# Patient Record
Sex: FEMALE | Race: WHITE | NOT HISPANIC OR LATINO | Employment: FULL TIME | URBAN - METROPOLITAN AREA
[De-identification: names, ages, dates, MRNs, and addresses within clinical notes are randomized per-mention and may not be internally consistent; named-entity substitution may affect disease eponyms.]

---

## 2017-04-03 ENCOUNTER — TRANSFERRED RECORDS (OUTPATIENT)
Dept: HEALTH INFORMATION MANAGEMENT | Facility: CLINIC | Age: 57
End: 2017-04-03

## 2017-11-16 ENCOUNTER — HOSPITAL ENCOUNTER (OUTPATIENT)
Dept: GENERAL RADIOLOGY | Facility: CLINIC | Age: 57
Discharge: HOME | End: 2017-11-16
Attending: INTERNAL MEDICINE | Admitting: INTERNAL MEDICINE
Payer: COMMERCIAL

## 2017-11-16 DIAGNOSIS — R05.9 COUGH: ICD-10-CM

## 2017-11-16 PROCEDURE — 71020 XR CHEST 2 VW: CPT

## 2019-05-13 ENCOUNTER — THERAPY VISIT (OUTPATIENT)
Dept: PHYSICAL THERAPY | Facility: CLINIC | Age: 59
End: 2019-05-13
Payer: COMMERCIAL

## 2019-05-13 DIAGNOSIS — M54.50 CHRONIC BILATERAL LOW BACK PAIN WITHOUT SCIATICA: ICD-10-CM

## 2019-05-13 DIAGNOSIS — G89.29 CHRONIC BILATERAL LOW BACK PAIN WITHOUT SCIATICA: ICD-10-CM

## 2019-05-13 PROCEDURE — 97110 THERAPEUTIC EXERCISES: CPT | Mod: GP | Performed by: PHYSICAL THERAPIST

## 2019-05-13 PROCEDURE — 97161 PT EVAL LOW COMPLEX 20 MIN: CPT | Mod: GP | Performed by: PHYSICAL THERAPIST

## 2019-05-13 NOTE — PROGRESS NOTES
[facility] Initial Evaluation  Subjective:  The history is provided by the patient. No  was used.   Lisa Wilson is a 58 year old female with a lumbar condition.  Condition occurred with:  Bending.  Context: while lifting suit case.  This is a chronic condition  Pt reports having bilateral LBP with occasional L thigh pain that has been present since leaning forward to  something off floor on 3/5/19. .    Patient reports pain:  Central lumbar spine.  Radiates to:  Thigh left.  Pain is described as aching and is intermittent and reported as 3/10.  Associated symptoms:  Loss of motion. Pain is worse during the day.  Symptoms are exacerbated by bending and lifting and relieved by nothing.  Since onset symptoms are unchanged.  Special tests:  X-ray (negative).  Previous treatment includes chiropractic.  There was no improvement following previous treatment.  General health as reported by patient is good.  Pertinent medical history includes:  Asthma and thyroid problems.  Medical allergies: yes (see EPIC).  Other surgeries include:  Orthopedic surgery (foot and knee).  Current medications:  Thyroid medication.  Current occupation is   .  Patient is working in normal job without restrictions.  Primary job tasks include:  Lifting, prolonged standing, operating a machine, prolonged sitting and repetitive tasks.    Barriers include:  None as reported by the patient.    Red flags:  None as reported by the patient.                        Objective:  Standing Alignment:        Lumbar:  Lordosis decr                Flexibility/Screens:   Positive screens:  Lumbar and SI Joint                   Lumbar/SI Evaluation  ROM:    AROM Lumbar:   Flexion:          100% with ERP  Ext:                    75% with ERP   Side Bend:        Left:  100%    Right:  75% with ERP  Rotation:           Left:     Right:   Side Glide:        Left:     Right:           Lumbar Myotomes:   normal            Lumbar DTR's:  normal        Lumbar Dermtomes:  normal                Neural Tension/Mobility:  Lumbar:  Normal        Lumbar Palpation:  normal          Spinal Segmental Conclusions:     Level: Hypo noted at L5, L4, L3, L2 and L1                                                   General     ROS    Assessment/Plan:    Patient is a 58 year old female with lumbar complaints.    Patient has the following significant findings with corresponding treatment plan.                    Diagnosis 1:  LBP  Pain -  US, manual therapy, self management, education, directional preference exercise and home program  Decreased ROM/flexibility - manual therapy and therapeutic exercise  Decreased strength - therapeutic exercise and therapeutic activities  Impaired muscle performance - neuro re-education  Decreased function - therapeutic activities  Therapy Evaluation Codes:   Previous and current functional limitations:  (See Goal Flow Sheet for this information)    Short term and Long term goals: (See Goal Flow Sheet for this information)     Communication ability:  Patient appears to be able to clearly communicate and understand verbal and written communication and follow directions correctly.  Treatment Explanation - The following has been discussed with the patient:   RX ordered/plan of care  Anticipated outcomes  Possible risks and side effects  This patient would benefit from PT intervention to resume normal activities.   Rehab potential is good.    Frequency:  1 X week, once daily  Duration:  for 6 weeks  Discharge Plan:  Achieve all LTG.  Independent in home treatment program.  Reach maximal therapeutic benefit.      Please refer to the daily flowsheet for treatment today, total treatment time and time spent performing 1:1 timed codes.

## 2019-05-13 NOTE — LETTER
[facility] ATHLETIC [facility] PHYSICAL THERAPY  [address]  [facility] [address]  [phone]    May 14, 2019    Re: Lisa Wilson   :   1960  MRN:  [id number]   REFERRING PHYSICIAN:   Chiara Hogan    [facility]  Date of Initial Evaluation:  19  Visits:  Rxs Used: 1  Reason for Referral:  Chronic bilateral low back pain without sciatica    EVALUATION SUMMARY    [facility] Athletic [facility] Initial Evaluation  Subjective:  The history is provided by the patient. No  was used.   Lisa Wilson is a 58 year old female with a lumbar condition.  Condition occurred with:  Bending.  Context: while lifting suit case.  This is a chronic condition  Pt reports having bilateral LBP with occasional L thigh pain that has been present since leaning forward to  something off floor on 3/5/19.   Patient reports pain:  Central lumbar spine.  Radiates to:  Thigh left.  Pain is described as aching and is intermittent and reported as 3/10.  Associated symptoms:  Loss of motion. Pain is worse during the day.  Symptoms are exacerbated by bending and lifting and relieved by nothing.  Since onset symptoms are unchanged.  Special tests:  X-ray (negative).  Previous treatment includes chiropractic.  There was no improvement following previous treatment.  General health as reported by patient is good.  Pertinent medical history includes:  Asthma and thyroid problems.  Medical allergies: yes (see EPIC).  Other surgeries include:  Orthopedic surgery (foot and knee).  Current medications:  Thyroid medication.  Current occupation is .  Patient is working in normal job without restrictions.  Primary job tasks include:  Lifting, prolonged standing, operating a machine, prolonged sitting and repetitive tasks.  Barriers include:  None as reported by the patient.  Red flags:  None as reported  by the patient.                Objective:  Standing Alignment:    Lumbar:  Lordosis decr  Flexibility/Screens:   Positive screens:  Lumbar and SI Joint      Lumbar/SI Evaluation  ROM:    AROM Lumbar:   Flexion:          100% with ERP  Ext:                    75% with ERP   Side Bend:        Left:  100%    Right:  75% with ERP  Rotation:           Left:     Right:   Side Glide:        Left:     Right:   Lumbar Myotomes:  normal  Lumbar DTR's:  normal  Lumbar Dermtomes:  normal  Neural Tension/Mobility:  Lumbar:  Normal    Lumbar Palpation:  normal  Re: Lisa Wilson   :   1960          Spinal Segmental Conclusions: Level: Hypo noted at L5, L4, L3, L2 and L1    Assessment/Plan:    Patient is a 58 year old female with lumbar complaints.    Patient has the following significant findings with corresponding treatment plan.                Diagnosis 1:  LBP  Pain -  US, manual therapy, self management, education, directional preference exercise and home program  Decreased ROM/flexibility - manual therapy and therapeutic exercise  Decreased strength - therapeutic exercise and therapeutic activities  Impaired muscle performance - neuro re-education  Decreased function - therapeutic activities.  Previous and current functional limitations:  (See Goal Flow Sheet for this information)    Short term and Long term goals: (See Goal Flow Sheet for this information)     Communication ability:  Patient appears to be able to clearly communicate and understand verbal and written communication and follow directions correctly.  Treatment Explanation - The following has been discussed with the patient:   RX ordered/plan of care  Anticipated outcomes  Possible risks and side effects  This patient would benefit from PT intervention to resume normal activities.   Rehab potential is good.    Frequency:  1 X week, once daily  Duration:  for 6 weeks  Discharge Plan:  Achieve all LTG.  Independent in home treatment program.  Reach  maximal therapeutic benefit.    Thank you for your referral.    INQUIRIES  Therapist: Usman Rucker,   [facility] [place] [facility]AP[facility]  [address] [address] [address]  Phone: [phone]  Fax: [phone]

## 2019-05-20 ENCOUNTER — THERAPY VISIT (OUTPATIENT)
Dept: PHYSICAL THERAPY | Facility: CLINIC | Age: 59
End: 2019-05-20
Payer: COMMERCIAL

## 2019-05-20 DIAGNOSIS — G89.29 CHRONIC BILATERAL LOW BACK PAIN WITHOUT SCIATICA: ICD-10-CM

## 2019-05-20 DIAGNOSIS — M54.50 CHRONIC BILATERAL LOW BACK PAIN WITHOUT SCIATICA: ICD-10-CM

## 2019-05-20 PROCEDURE — 97110 THERAPEUTIC EXERCISES: CPT | Mod: GP | Performed by: PHYSICAL THERAPIST

## 2019-05-20 PROCEDURE — 97035 APP MDLTY 1+ULTRASOUND EA 15: CPT | Mod: GP | Performed by: PHYSICAL THERAPIST

## 2019-06-04 ENCOUNTER — THERAPY VISIT (OUTPATIENT)
Dept: PHYSICAL THERAPY | Facility: CLINIC | Age: 59
End: 2019-06-04
Payer: COMMERCIAL

## 2019-06-04 DIAGNOSIS — M54.50 CHRONIC BILATERAL LOW BACK PAIN WITHOUT SCIATICA: ICD-10-CM

## 2019-06-04 DIAGNOSIS — G89.29 CHRONIC BILATERAL LOW BACK PAIN WITHOUT SCIATICA: ICD-10-CM

## 2019-06-04 PROCEDURE — 97035 APP MDLTY 1+ULTRASOUND EA 15: CPT | Mod: GP | Performed by: PHYSICAL THERAPIST

## 2019-06-04 PROCEDURE — 97110 THERAPEUTIC EXERCISES: CPT | Mod: GP | Performed by: PHYSICAL THERAPIST

## 2019-06-27 PROBLEM — M54.50 CHRONIC BILATERAL LOW BACK PAIN WITHOUT SCIATICA: Status: RESOLVED | Noted: 2019-05-13 | Resolved: 2019-06-27

## 2019-06-27 PROBLEM — G89.29 CHRONIC BILATERAL LOW BACK PAIN WITHOUT SCIATICA: Status: RESOLVED | Noted: 2019-05-13 | Resolved: 2019-06-27

## 2019-06-27 NOTE — PROGRESS NOTES
"Discharge Note    Progress reporting period is from initial eval to Jun 4, 2019.     Lisa failed to return for next follow up visit and current status is unknown.  Please see information below for last relevant information on current status.  Patient seen for Rxs Used: 3 visits.  SUBJECTIVE  Subjective changes noted by patient:  Subjective: Better ovarall.  Episodes of increased pain after lifting 40# suitcase.  Functionig at 75% of whre she want sot be.  Good days and bads days persist and apprehension occasionally with lifting baggage  .  Current pain level is Current Pain level: 2/10.     Previous pain level was  Initial Pain level: 3/10.   Changes in function:  Yes (See Goal flowsheet attached for changes in current functional level)  Adverse reaction to treatment or activity: None    OBJECTIVE  Changes noted in objective findings: Objective: LROM: flexion 100%, Ext 80+% with ER \"stiffness\", R and L SBing 100% with ER \"stiffness\"  Tender L PSIS persists     ASSESSMENT/PLAN  Diagnosis: LBP   DIAGP:  The encounter diagnosis was Chronic bilateral low back pain without sciatica.  Updated problem list and treatment plan:   Decreased strength - HEP  STG/LTGs have been met or progress has been made towards goals:  Yes, please see goal flowsheet for most current information  Assessment of Progress: current status is unknown.  Last current status: Assessment of progress: Pt is progressing well, Pt is progressing as expected   Self Management Plans:  HEP  I have re-evaluated this patient and find that the nature, scope, duration and intensity of the therapy is appropriate for the medical condition of the patient.  Lisa continues to require the following intervention to meet STG and LTG's:  HEP.    Recommendations:  Discharge with current home program.  Patient to follow up with MD as needed.    Please refer to the daily flowsheet for treatment today, total treatment time and time spent performing 1:1 timed codes.  "

## 2019-08-16 ENCOUNTER — THERAPY VISIT (OUTPATIENT)
Dept: PHYSICAL THERAPY | Facility: CLINIC | Age: 59
End: 2019-08-16
Payer: COMMERCIAL

## 2019-08-16 DIAGNOSIS — M54.50 CHRONIC BILATERAL LOW BACK PAIN WITHOUT SCIATICA: ICD-10-CM

## 2019-08-16 DIAGNOSIS — G89.29 CHRONIC BILATERAL LOW BACK PAIN WITHOUT SCIATICA: ICD-10-CM

## 2019-08-16 PROCEDURE — 97110 THERAPEUTIC EXERCISES: CPT | Mod: GP | Performed by: PHYSICAL THERAPIST

## 2019-08-16 PROCEDURE — 97112 NEUROMUSCULAR REEDUCATION: CPT | Mod: GP | Performed by: PHYSICAL THERAPIST

## 2019-08-16 PROCEDURE — 97035 APP MDLTY 1+ULTRASOUND EA 15: CPT | Mod: GP | Performed by: PHYSICAL THERAPIST

## 2019-08-16 NOTE — LETTER
[facility] ATHLETIC [facility] PHYSICAL THERAPY  [address]  [facility] [address]  [phone]    2019    Re: Lisa Wilson   :   1960  MRN:  [id number]   REFERRING PHYSICIAN:   Chiara Hogan    [facility] ATHLETIC [facility] PHYSICAL [facility]  Date of Initial Evaluation:  19  Visits:  Rxs Used: 4  Reason for Referral:  Chronic bilateral low back pain without sciatica      PROGRESS  REPORT:  Progress reporting period is from IE to 19.       SUBJECTIVE  Subjective changes noted by patient:  Subjective: Pt returns after break in PT due to vacation and was doing better.  States over past couple weeks noted increasing pain again.  Restarted medication (anti-inflam).  Current pain level is  Current Pain level: (2-3/10).  Previous pain level was Initial Pain level: 3/10.  Changes in function:  Yes, but recent flare up has limited function.  Adverse reaction to treatment or activity: activity - daily activity.  Oswestry Score: 12 %     OBJECTIVE  Changes noted in objective findings:  Objective: Lx ROM: flex ERP, ext min loss +,   B SB ERT/P.  Hypomobile L2-5 + and TTP B PSIS     ASSESSMENT/PLAN  Updated problem list and treatment plan: Diagnosis 1:  LBP  Pain -  hot/cold therapy, US, manual therapy, directional preference exercise and home program.  Decreased ROM/flexibility - manual therapy and therapeutic exercise.  Decreased strength - therapeutic exercise and therapeutic activities.  Impaired muscle performance - neuro re-education.  Decreased function - therapeutic activities.  Impaired posture - neuro re-education.  STG/LTGs have been met or progress has been made towards goals:  Yes, but recent flare up showed functional regression.  Assessment of Progress: The patient's condition has exacerbated.  Self Management Plans:  Patient has been instructed in a home treatment program.  Patient  has been instructed in self management  of symptoms.  I have re-evaluated this patient and find that the nature, scope, duration and intensity of the therapy is appropriate for the medical condition of the patient.  Lisa continues to require the following intervention to meet STG and LTG's:  PT    Recommendations:  This patient would benefit from continued therapy.     Frequency:  1 X week, once daily  Duration:  for 4-6 visits    If you agree to the above recommendation please sign and date below and return this order to the clinic listed below.        Signature __________________________________________Date: ___________    Thank you for your referral.    INQUIRIES  Therapist:  Malik Mccarty, [facility]  [facility] [place] [facility]  [id number] [place] [address]  Phone: [phone]  Fax: [phone]

## 2019-08-16 NOTE — PROGRESS NOTES
Subjective:  HPI  Oswestry Score: 12 %                 Objective:  System    Physical Exam    General     ROS    Assessment/Plan:    PROGRESS  REPORT    Progress reporting period is from IE to 8/16/19.       SUBJECTIVE  Subjective changes noted by patient:  .  Subjective: Pt returns after break in PT due to vacation and was doing better.  States over past couple weeks noted increasing pain again.  Restarted medication (anti-inflam).    Current pain level is  Current Pain level: (2-3/10).     Previous pain level was   Initial Pain level: 3/10.   Changes in function:  Yes, but recent flare up has limited function.  Adverse reaction to treatment or activity: activity - daily activity.    OBJECTIVE  Changes noted in objective findings:    Objective: Lx ROM: flex ERP, ext min loss +,   B SB ERT/P.  Hypomobile L2-5 + and TTP B PSIS     ASSESSMENT/PLAN  Updated problem list and treatment plan: Diagnosis 1:  LBP  Pain -  hot/cold therapy, US, manual therapy, directional preference exercise and home program  Decreased ROM/flexibility - manual therapy and therapeutic exercise  Decreased strength - therapeutic exercise and therapeutic activities  Impaired muscle performance - neuro re-education  Decreased function - therapeutic activities  Impaired posture - neuro re-education  STG/LTGs have been met or progress has been made towards goals:  Yes, but recent flare up showed functional regression.  Assessment of Progress: The patient's condition has exacerbated.  Self Management Plans:  Patient has been instructed in a home treatment program.  Patient  has been instructed in self management of symptoms.  I have re-evaluated this patient and find that the nature, scope, duration and intensity of the therapy is appropriate for the medical condition of the patient.  Lisa continues to require the following intervention to meet STG and LTG's:  PT    Recommendations:  This patient would benefit from continued therapy.     Frequency:   1 X week, once daily  Duration:  for 4-6 visits      If you agree to the above recommendation please sign and date below and return this order to the clinic listed below.        Signature __________________________________________Date: ___________          Please refer to the daily flowsheet for treatment today, total treatment time and time spent performing 1:1 timed codes.

## 2020-12-07 LAB — MAMMOGRAM: NORMAL

## 2020-12-23 ENCOUNTER — TRANSFERRED RECORDS (OUTPATIENT)
Dept: SURGERY | Facility: CLINIC | Age: 60
End: 2020-12-23

## 2021-03-04 ENCOUNTER — MEDICAL CORRESPONDENCE (OUTPATIENT)
Dept: HEALTH INFORMATION MANAGEMENT | Facility: CLINIC | Age: 61
End: 2021-03-04

## 2021-03-14 ENCOUNTER — HEALTH MAINTENANCE LETTER (OUTPATIENT)
Age: 61
End: 2021-03-14

## 2021-03-15 ENCOUNTER — TRANSFERRED RECORDS (OUTPATIENT)
Dept: HEALTH INFORMATION MANAGEMENT | Facility: CLINIC | Age: 61
End: 2021-03-15

## 2021-03-17 ENCOUNTER — TRANSFERRED RECORDS (OUTPATIENT)
Dept: HEALTH INFORMATION MANAGEMENT | Facility: CLINIC | Age: 61
End: 2021-03-17

## 2021-03-22 ENCOUNTER — TRANSFERRED RECORDS (OUTPATIENT)
Dept: HEALTH INFORMATION MANAGEMENT | Facility: CLINIC | Age: 61
End: 2021-03-22

## 2021-07-06 ENCOUNTER — HOSPITAL ENCOUNTER (EMERGENCY)
Facility: CLINIC | Age: 61
Discharge: HOME | End: 2021-07-07
Attending: EMERGENCY MEDICINE | Admitting: EMERGENCY MEDICINE
Payer: COMMERCIAL

## 2021-07-06 DIAGNOSIS — T85.79XA: ICD-10-CM

## 2021-07-06 DIAGNOSIS — L03.313 CELLULITIS OF CHEST WALL: ICD-10-CM

## 2021-07-06 LAB
ANION GAP SERPL CALCULATED.3IONS-SCNC: 5 MMOL/L (ref 3–14)
BASOPHILS # BLD AUTO: 0.1 10E9/L (ref 0–0.2)
BASOPHILS NFR BLD AUTO: 0.9 %
BUN SERPL-MCNC: 12 MG/DL (ref 7–30)
CALCIUM SERPL-MCNC: 9.3 MG/DL (ref 8.5–10.1)
CHLORIDE SERPL-SCNC: 108 MMOL/L (ref 94–109)
CO2 SERPL-SCNC: 28 MMOL/L (ref 20–32)
CREAT SERPL-MCNC: 0.66 MG/DL (ref 0.52–1.04)
DIFFERENTIAL METHOD BLD: NORMAL
EOSINOPHIL # BLD AUTO: 0.5 10E9/L (ref 0–0.7)
EOSINOPHIL NFR BLD AUTO: 5.1 %
ERYTHROCYTE [DISTWIDTH] IN BLOOD BY AUTOMATED COUNT: 11.9 % (ref 10–15)
GFR SERPL CREATININE-BSD FRML MDRD: >90 ML/MIN/{1.73_M2}
GLUCOSE SERPL-MCNC: 134 MG/DL (ref 70–99)
HCT VFR BLD AUTO: 39.9 % (ref 35–47)
HGB BLD-MCNC: 13.8 G/DL (ref 11.7–15.7)
IMM GRANULOCYTES # BLD: 0 10E9/L (ref 0–0.4)
IMM GRANULOCYTES NFR BLD: 0.4 %
LACTATE BLD-SCNC: 1.5 MMOL/L (ref 0.7–2)
LYMPHOCYTES # BLD AUTO: 1.4 10E9/L (ref 0.8–5.3)
LYMPHOCYTES NFR BLD AUTO: 15.6 %
MCH RBC QN AUTO: 30.1 PG (ref 26.5–33)
MCHC RBC AUTO-ENTMCNC: 34.6 G/DL (ref 31.5–36.5)
MCV RBC AUTO: 87 FL (ref 78–100)
MONOCYTES # BLD AUTO: 0.9 10E9/L (ref 0–1.3)
MONOCYTES NFR BLD AUTO: 9.2 %
NEUTROPHILS # BLD AUTO: 6.3 10E9/L (ref 1.6–8.3)
NEUTROPHILS NFR BLD AUTO: 68.8 %
NRBC # BLD AUTO: 0 10*3/UL
NRBC BLD AUTO-RTO: 0 /100
PLATELET # BLD AUTO: 345 10E9/L (ref 150–450)
POTASSIUM SERPL-SCNC: 3.9 MMOL/L (ref 3.4–5.3)
RBC # BLD AUTO: 4.59 10E12/L (ref 3.8–5.2)
SODIUM SERPL-SCNC: 141 MMOL/L (ref 133–144)
WBC # BLD AUTO: 9.2 10E9/L (ref 4–11)

## 2021-07-06 PROCEDURE — 99284 EMERGENCY DEPT VISIT MOD MDM: CPT | Mod: 25

## 2021-07-06 PROCEDURE — 80048 BASIC METABOLIC PNL TOTAL CA: CPT | Performed by: EMERGENCY MEDICINE

## 2021-07-06 PROCEDURE — 83605 ASSAY OF LACTIC ACID: CPT | Performed by: EMERGENCY MEDICINE

## 2021-07-06 PROCEDURE — 85025 COMPLETE CBC W/AUTO DIFF WBC: CPT | Performed by: EMERGENCY MEDICINE

## 2021-07-06 PROCEDURE — 96365 THER/PROPH/DIAG IV INF INIT: CPT

## 2021-07-06 PROCEDURE — 250N000009 HC RX 250: Performed by: EMERGENCY MEDICINE

## 2021-07-06 RX ORDER — DOXYCYCLINE 100 MG/10ML
100 INJECTION, POWDER, LYOPHILIZED, FOR SOLUTION INTRAVENOUS ONCE
Status: COMPLETED | OUTPATIENT
Start: 2021-07-06 | End: 2021-07-07

## 2021-07-06 RX ADMIN — DOXYCYCLINE 100 MG: 100 INJECTION, POWDER, LYOPHILIZED, FOR SOLUTION INTRAVENOUS at 23:46

## 2021-07-06 ASSESSMENT — ENCOUNTER SYMPTOMS
COLOR CHANGE: 1
FEVER: 0
WOUND: 1

## 2021-07-06 ASSESSMENT — MIFFLIN-ST. JEOR: SCORE: 1285.76

## 2021-07-07 VITALS
HEIGHT: 64 IN | BODY MASS INDEX: 27.31 KG/M2 | RESPIRATION RATE: 18 BRPM | DIASTOLIC BLOOD PRESSURE: 53 MMHG | TEMPERATURE: 96.9 F | WEIGHT: 160 LBS | HEART RATE: 68 BPM | OXYGEN SATURATION: 97 % | SYSTOLIC BLOOD PRESSURE: 99 MMHG

## 2021-07-07 NOTE — ED PROVIDER NOTES
"  History   Chief Complaint:  Wound Check     The history is provided by the patient.      Lisa Wilson is a 60 year old female with history of a double mastectomy, hypothyroidism and hyperlipidemia who presents with a wound check. The patient reports that this past weekend her right breast expander insertion site started to become very red and irritated. This has happened to her in the past and the breast expander was most recently replaced on 06.14.21. She has expanders in both breasts due to a double mastectomy in February 2021. She was prescribed Clindamycin for concern of an infection and she has taken 1 pill of that. She does have an appointment with her plastic surgeon, Dr. Estella Hassan, tomorrow at 0945 but is concerned about how red it has gotten. The patient denies any fevers.     Review of Systems   Constitutional: Negative for fever.   Skin: Positive for color change (Redness) and wound (Breast expander surgery site).   All other systems reviewed and are negative.    Allergies:  Codeine Sulfate    Medications:  Albuterol inhaler  Cleocin  Femara  Mobic  Inderal LA  Aspirin 81 mg  Levothyroxine    Past Medical History:    Hyperlipidemia   Hypothyroidism  Lyme's disease    Past Surgical History:    Tubal ligation  Bunionectomy  Double mastectomy    Family History:    Hyperlipidemia  CAD  HTN    Social History:  Patient presents alone.     Physical Exam     Patient Vitals for the past 24 hrs:   BP Temp Temp src Pulse Resp SpO2 Height Weight   07/06/21 2348 99/47 -- -- 64 -- 94 % -- --   07/06/21 2000 126/53 96.9  F (36.1  C) Temporal 54 18 94 % 1.626 m (5' 4\") 72.6 kg (160 lb)       Physical Exam  Eyes:  Sclera white; Pupils are equal and round  ENT:    External ears and nares normal  CV:  Rate as above with regular rhythm   Resp:  Breath sounds clear and equal bilaterally    Non-labored, no retractions or accessory muscle use  MS:  Moves all extremities  Skin:  Warm and dry, R chest wall erythematous " extending to midline and covering entire extent of expanded tissue.  No fluctuance, induration, or drainage.  Incision is intact.  Neuro:  Speech is normal and fluent. No apparent deficit.        Emergency Department Course   Laboratory:  CBC: WBC 9.2, HGB 13.8,   BMP: Glucose 134 (H), o/w WNL (Creatinine: 0.66)    Lactic acid (Resulted 2022): 1.5     Emergency Department Course:    Reviewed:  I reviewed nursing notes, vitals, past medical history and care everywhere    Assessments:  2225 I obtained history and examined the patient as noted above.    I rechecked the patient and explained findings.     Interventions:  2346 Vibramycin, 100 mg, IV    Disposition:  The patient was discharged to home.     Impression & Plan     Medical Decision Making:  Lisa Wilson is here for evaluation of erythematis breast tissue at the site of an expander. Exam is consistent with cellulitis. Blood work showed no evidence of sepsis or severe sepsis.  Clindamycin is bacteriostatic and can take longer to work.  She has a history of MRSA and clindamycin has increasing resistance.  Antibiotics for the night were discussed with pharmacy who recommended doxycycline. This will add additional coverage for the Clindamycin she is on and will cover her for the 12 hours until she can see her surgeon. We attempted to contact the surgery clinic and left a voicemail, but did not receive a phone call at night while she was in the ED.    Diagnosis:    ICD-10-CM    1. Cellulitis of chest wall  L03.313    2. Infection at site of tissue expander, initial encounter (H)  T85.79XA      Scribe Disclosure:  IAutumn, am serving as a scribe at 10:23 PM on 7/6/2021 to document services personally performed by Wanda Nolasco MD based on my observations and the provider's statements to me.            Wanda Nolasco MD  07/07/21 0049

## 2021-07-07 NOTE — ED TRIAGE NOTES
Pt reports breast expander placed June 14th. Previous expanded became infected that was placed Feb 10 and came out April. Pt reports wound around right breast red, warm to the touch, and denies drainage.

## 2021-10-24 ENCOUNTER — HEALTH MAINTENANCE LETTER (OUTPATIENT)
Age: 61
End: 2021-10-24

## 2021-12-28 ENCOUNTER — MEDICAL CORRESPONDENCE (OUTPATIENT)
Dept: HEALTH INFORMATION MANAGEMENT | Facility: CLINIC | Age: 61
End: 2021-12-28

## 2022-01-31 ENCOUNTER — TRANSFERRED RECORDS (OUTPATIENT)
Dept: HEALTH INFORMATION MANAGEMENT | Facility: CLINIC | Age: 62
End: 2022-01-31

## 2022-04-10 ENCOUNTER — HEALTH MAINTENANCE LETTER (OUTPATIENT)
Age: 62
End: 2022-04-10

## 2022-07-25 ENCOUNTER — TRANSFERRED RECORDS (OUTPATIENT)
Dept: HEALTH INFORMATION MANAGEMENT | Facility: CLINIC | Age: 62
End: 2022-07-25

## 2022-10-15 ENCOUNTER — HEALTH MAINTENANCE LETTER (OUTPATIENT)
Age: 62
End: 2022-10-15

## 2022-12-06 ENCOUNTER — LAB REQUISITION (OUTPATIENT)
Dept: LAB | Facility: CLINIC | Age: 62
End: 2022-12-06
Payer: COMMERCIAL

## 2022-12-06 DIAGNOSIS — R31.9 HEMATURIA, UNSPECIFIED: ICD-10-CM

## 2022-12-06 DIAGNOSIS — Z12.4 ENCOUNTER FOR SCREENING FOR MALIGNANT NEOPLASM OF CERVIX: ICD-10-CM

## 2022-12-06 LAB
ALBUMIN UR-MCNC: NEGATIVE MG/DL
APPEARANCE UR: CLEAR
BACTERIA #/AREA URNS HPF: ABNORMAL /HPF
BILIRUB UR QL STRIP: NEGATIVE
COLOR UR AUTO: ABNORMAL
GLUCOSE UR STRIP-MCNC: NEGATIVE MG/DL
HGB UR QL STRIP: ABNORMAL
KETONES UR STRIP-MCNC: NEGATIVE MG/DL
LEUKOCYTE ESTERASE UR QL STRIP: NEGATIVE
NITRATE UR QL: NEGATIVE
PH UR STRIP: 6.5 [PH] (ref 5–7)
RBC URINE: 1 /HPF
SP GR UR STRIP: 1.02 (ref 1–1.03)
SQUAMOUS EPITHELIAL: <1 /HPF
UROBILINOGEN UR STRIP-MCNC: NORMAL MG/DL
WBC URINE: <1 /HPF

## 2022-12-06 PROCEDURE — 81001 URINALYSIS AUTO W/SCOPE: CPT | Mod: ORL | Performed by: OBSTETRICS & GYNECOLOGY

## 2022-12-06 PROCEDURE — 87086 URINE CULTURE/COLONY COUNT: CPT | Mod: ORL | Performed by: OBSTETRICS & GYNECOLOGY

## 2022-12-06 PROCEDURE — G0123 SCREEN CERV/VAG THIN LAYER: HCPCS | Mod: ORL | Performed by: OBSTETRICS & GYNECOLOGY

## 2022-12-08 LAB — BACTERIA UR CULT: NORMAL

## 2022-12-09 LAB
BKR LAB AP GYN ADEQUACY: NORMAL
BKR LAB AP GYN INTERPRETATION: NORMAL
BKR LAB AP HPV REFLEX: NORMAL
BKR LAB AP LMP: NORMAL
BKR LAB AP PREVIOUS ABNL DX: NORMAL
BKR LAB AP PREVIOUS ABNORMAL: NORMAL
PATH REPORT.COMMENTS IMP SPEC: NORMAL
PATH REPORT.COMMENTS IMP SPEC: NORMAL
PATH REPORT.RELEVANT HX SPEC: NORMAL

## 2022-12-21 ENCOUNTER — TRANSFERRED RECORDS (OUTPATIENT)
Dept: HEALTH INFORMATION MANAGEMENT | Facility: CLINIC | Age: 62
End: 2022-12-21

## 2022-12-21 ENCOUNTER — LAB REQUISITION (OUTPATIENT)
Dept: LAB | Facility: CLINIC | Age: 62
End: 2022-12-21
Payer: COMMERCIAL

## 2022-12-21 DIAGNOSIS — E55.9 VITAMIN D DEFICIENCY, UNSPECIFIED: ICD-10-CM

## 2022-12-21 DIAGNOSIS — Z85.3 PERSONAL HISTORY OF MALIGNANT NEOPLASM OF BREAST: ICD-10-CM

## 2022-12-21 DIAGNOSIS — E03.9 HYPOTHYROIDISM, UNSPECIFIED: ICD-10-CM

## 2022-12-21 DIAGNOSIS — Z13.220 ENCOUNTER FOR SCREENING FOR LIPOID DISORDERS: ICD-10-CM

## 2022-12-21 LAB
ALBUMIN SERPL BCG-MCNC: 4.4 G/DL (ref 3.5–5.2)
ALP SERPL-CCNC: 117 U/L (ref 35–104)
ALT SERPL W P-5'-P-CCNC: 29 U/L (ref 10–35)
ANION GAP SERPL CALCULATED.3IONS-SCNC: 11 MMOL/L (ref 7–15)
AST SERPL W P-5'-P-CCNC: 19 U/L (ref 10–35)
BILIRUB SERPL-MCNC: 0.5 MG/DL
BUN SERPL-MCNC: 21 MG/DL (ref 8–23)
CALCIUM SERPL-MCNC: 9.8 MG/DL (ref 8.8–10.2)
CHLORIDE SERPL-SCNC: 105 MMOL/L (ref 98–107)
CHOLEST SERPL-MCNC: 213 MG/DL
CREAT SERPL-MCNC: 0.76 MG/DL (ref 0.51–0.95)
DEPRECATED HCO3 PLAS-SCNC: 24 MMOL/L (ref 22–29)
ERYTHROCYTE [DISTWIDTH] IN BLOOD BY AUTOMATED COUNT: 11.9 % (ref 10–15)
GFR SERPL CREATININE-BSD FRML MDRD: 89 ML/MIN/1.73M2
GLUCOSE SERPL-MCNC: 97 MG/DL (ref 70–99)
HCT VFR BLD AUTO: 43.2 % (ref 35–47)
HDLC SERPL-MCNC: 61 MG/DL
HGB BLD-MCNC: 14.5 G/DL (ref 11.7–15.7)
HOLD SPECIMEN: NORMAL
LDLC SERPL CALC-MCNC: 130 MG/DL
MCH RBC QN AUTO: 30.1 PG (ref 26.5–33)
MCHC RBC AUTO-ENTMCNC: 33.6 G/DL (ref 31.5–36.5)
MCV RBC AUTO: 90 FL (ref 78–100)
NONHDLC SERPL-MCNC: 152 MG/DL
PLATELET # BLD AUTO: 270 10E3/UL (ref 150–450)
POTASSIUM SERPL-SCNC: 4.3 MMOL/L (ref 3.4–5.3)
PROT SERPL-MCNC: 7.1 G/DL (ref 6.4–8.3)
RBC # BLD AUTO: 4.81 10E6/UL (ref 3.8–5.2)
SODIUM SERPL-SCNC: 140 MMOL/L (ref 136–145)
T4 FREE SERPL-MCNC: 1.49 NG/DL (ref 0.9–1.7)
TRIGL SERPL-MCNC: 111 MG/DL
TSH SERPL DL<=0.005 MIU/L-ACNC: 2.93 UIU/ML (ref 0.3–4.2)
WBC # BLD AUTO: 5.7 10E3/UL (ref 4–11)

## 2022-12-21 PROCEDURE — 84443 ASSAY THYROID STIM HORMONE: CPT | Mod: ORL | Performed by: OBSTETRICS & GYNECOLOGY

## 2022-12-21 PROCEDURE — 82306 VITAMIN D 25 HYDROXY: CPT | Mod: ORL | Performed by: OBSTETRICS & GYNECOLOGY

## 2022-12-21 PROCEDURE — 85027 COMPLETE CBC AUTOMATED: CPT | Mod: ORL | Performed by: OBSTETRICS & GYNECOLOGY

## 2022-12-21 PROCEDURE — 80061 LIPID PANEL: CPT | Mod: ORL | Performed by: OBSTETRICS & GYNECOLOGY

## 2022-12-21 PROCEDURE — 80053 COMPREHEN METABOLIC PANEL: CPT | Mod: ORL | Performed by: OBSTETRICS & GYNECOLOGY

## 2022-12-21 PROCEDURE — 84439 ASSAY OF FREE THYROXINE: CPT | Mod: ORL | Performed by: OBSTETRICS & GYNECOLOGY

## 2022-12-22 LAB — DEPRECATED CALCIDIOL+CALCIFEROL SERPL-MC: 28 UG/L (ref 20–75)

## 2023-03-26 ENCOUNTER — HEALTH MAINTENANCE LETTER (OUTPATIENT)
Age: 63
End: 2023-03-26

## 2023-05-04 ENCOUNTER — MEDICAL CORRESPONDENCE (OUTPATIENT)
Dept: HEALTH INFORMATION MANAGEMENT | Facility: CLINIC | Age: 63
End: 2023-05-04
Payer: COMMERCIAL

## 2023-05-08 ENCOUNTER — TELEPHONE (OUTPATIENT)
Dept: FAMILY MEDICINE | Facility: CLINIC | Age: 63
End: 2023-05-08

## 2023-05-08 NOTE — TELEPHONE ENCOUNTER
DANIELLA calling to see if we want just specific records or everything. Advised to send everything. She will mail them attention to Mindy Jacobo.    CALVIN Yang on 5/8/2023 at 11:35 AM

## 2023-06-01 ENCOUNTER — HEALTH MAINTENANCE LETTER (OUTPATIENT)
Age: 63
End: 2023-06-01

## 2023-06-07 ENCOUNTER — OFFICE VISIT (OUTPATIENT)
Dept: PEDIATRICS | Facility: CLINIC | Age: 63
End: 2023-06-07
Payer: COMMERCIAL

## 2023-06-07 ENCOUNTER — MEDICAL CORRESPONDENCE (OUTPATIENT)
Dept: HEALTH INFORMATION MANAGEMENT | Facility: CLINIC | Age: 63
End: 2023-06-07

## 2023-06-07 VITALS
TEMPERATURE: 98.2 F | WEIGHT: 158 LBS | HEART RATE: 63 BPM | DIASTOLIC BLOOD PRESSURE: 62 MMHG | OXYGEN SATURATION: 98 % | SYSTOLIC BLOOD PRESSURE: 110 MMHG | RESPIRATION RATE: 16 BRPM | HEIGHT: 64 IN | BODY MASS INDEX: 26.98 KG/M2

## 2023-06-07 DIAGNOSIS — Z11.59 NEED FOR HEPATITIS C SCREENING TEST: ICD-10-CM

## 2023-06-07 DIAGNOSIS — M54.50 CHRONIC BILATERAL LOW BACK PAIN WITHOUT SCIATICA: ICD-10-CM

## 2023-06-07 DIAGNOSIS — G89.29 CHRONIC BILATERAL LOW BACK PAIN WITHOUT SCIATICA: ICD-10-CM

## 2023-06-07 DIAGNOSIS — E78.5 HYPERLIPIDEMIA, UNSPECIFIED HYPERLIPIDEMIA TYPE: ICD-10-CM

## 2023-06-07 DIAGNOSIS — Z11.4 SCREENING FOR HIV (HUMAN IMMUNODEFICIENCY VIRUS): ICD-10-CM

## 2023-06-07 DIAGNOSIS — E03.9 HYPOTHYROIDISM, UNSPECIFIED TYPE: ICD-10-CM

## 2023-06-07 DIAGNOSIS — Z00.00 ROUTINE GENERAL MEDICAL EXAMINATION AT A HEALTH CARE FACILITY: Primary | ICD-10-CM

## 2023-06-07 DIAGNOSIS — R06.02 SHORTNESS OF BREATH: ICD-10-CM

## 2023-06-07 DIAGNOSIS — C50.911 MALIGNANT NEOPLASM OF RIGHT FEMALE BREAST, UNSPECIFIED ESTROGEN RECEPTOR STATUS, UNSPECIFIED SITE OF BREAST (H): ICD-10-CM

## 2023-06-07 LAB
ALBUMIN SERPL BCG-MCNC: 4.1 G/DL (ref 3.5–5.2)
ALP SERPL-CCNC: 116 U/L (ref 35–104)
ALT SERPL W P-5'-P-CCNC: 24 U/L (ref 10–35)
ANION GAP SERPL CALCULATED.3IONS-SCNC: 13 MMOL/L (ref 7–15)
AST SERPL W P-5'-P-CCNC: 29 U/L (ref 10–35)
BILIRUB SERPL-MCNC: 0.5 MG/DL
BUN SERPL-MCNC: 13.2 MG/DL (ref 8–23)
CALCIUM SERPL-MCNC: 9.7 MG/DL (ref 8.8–10.2)
CHLORIDE SERPL-SCNC: 104 MMOL/L (ref 98–107)
CHOLEST SERPL-MCNC: 205 MG/DL
CREAT SERPL-MCNC: 0.67 MG/DL (ref 0.51–0.95)
DEPRECATED HCO3 PLAS-SCNC: 24 MMOL/L (ref 22–29)
GFR SERPL CREATININE-BSD FRML MDRD: >90 ML/MIN/1.73M2
GLUCOSE SERPL-MCNC: 87 MG/DL (ref 70–99)
HCV AB SERPL QL IA: NONREACTIVE
HDLC SERPL-MCNC: 62 MG/DL
HIV 1+2 AB+HIV1 P24 AG SERPL QL IA: NONREACTIVE
LDLC SERPL CALC-MCNC: 129 MG/DL
NONHDLC SERPL-MCNC: 143 MG/DL
POTASSIUM SERPL-SCNC: 4.2 MMOL/L (ref 3.4–5.3)
PROT SERPL-MCNC: 7.1 G/DL (ref 6.4–8.3)
SODIUM SERPL-SCNC: 141 MMOL/L (ref 136–145)
TRIGL SERPL-MCNC: 69 MG/DL
TSH SERPL DL<=0.005 MIU/L-ACNC: 1.43 UIU/ML (ref 0.3–4.2)

## 2023-06-07 PROCEDURE — 80061 LIPID PANEL: CPT | Performed by: NURSE PRACTITIONER

## 2023-06-07 PROCEDURE — 86803 HEPATITIS C AB TEST: CPT | Performed by: NURSE PRACTITIONER

## 2023-06-07 PROCEDURE — 99386 PREV VISIT NEW AGE 40-64: CPT | Mod: 25 | Performed by: NURSE PRACTITIONER

## 2023-06-07 PROCEDURE — 87389 HIV-1 AG W/HIV-1&-2 AB AG IA: CPT | Performed by: NURSE PRACTITIONER

## 2023-06-07 PROCEDURE — 84443 ASSAY THYROID STIM HORMONE: CPT | Performed by: NURSE PRACTITIONER

## 2023-06-07 PROCEDURE — 80053 COMPREHEN METABOLIC PANEL: CPT | Performed by: NURSE PRACTITIONER

## 2023-06-07 PROCEDURE — 99213 OFFICE O/P EST LOW 20 MIN: CPT | Mod: 25 | Performed by: NURSE PRACTITIONER

## 2023-06-07 PROCEDURE — 36415 COLL VENOUS BLD VENIPUNCTURE: CPT | Performed by: NURSE PRACTITIONER

## 2023-06-07 RX ORDER — MILK THISTLE FRUIT EXTRACT 140 MG
2 CAPSULE ORAL DAILY
COMMUNITY
Start: 2022-05-16

## 2023-06-07 RX ORDER — LETROZOLE 2.5 MG/1
1 TABLET, FILM COATED ORAL
COMMUNITY
Start: 2023-05-16

## 2023-06-07 RX ORDER — LEVOTHYROXINE SODIUM 112 UG/1
1 TABLET ORAL
COMMUNITY
Start: 2023-02-07 | End: 2024-04-02

## 2023-06-07 RX ORDER — MELOXICAM 7.5 MG/1
TABLET ORAL
COMMUNITY
Start: 2022-05-23

## 2023-06-07 SDOH — ECONOMIC STABILITY: TRANSPORTATION INSECURITY
IN THE PAST 12 MONTHS, HAS THE LACK OF TRANSPORTATION KEPT YOU FROM MEDICAL APPOINTMENTS OR FROM GETTING MEDICATIONS?: NO

## 2023-06-07 SDOH — ECONOMIC STABILITY: FOOD INSECURITY: WITHIN THE PAST 12 MONTHS, THE FOOD YOU BOUGHT JUST DIDN'T LAST AND YOU DIDN'T HAVE MONEY TO GET MORE.: NEVER TRUE

## 2023-06-07 SDOH — ECONOMIC STABILITY: INCOME INSECURITY: IN THE LAST 12 MONTHS, WAS THERE A TIME WHEN YOU WERE NOT ABLE TO PAY THE MORTGAGE OR RENT ON TIME?: NO

## 2023-06-07 SDOH — ECONOMIC STABILITY: TRANSPORTATION INSECURITY
IN THE PAST 12 MONTHS, HAS LACK OF TRANSPORTATION KEPT YOU FROM MEETINGS, WORK, OR FROM GETTING THINGS NEEDED FOR DAILY LIVING?: NO

## 2023-06-07 SDOH — ECONOMIC STABILITY: FOOD INSECURITY: WITHIN THE PAST 12 MONTHS, YOU WORRIED THAT YOUR FOOD WOULD RUN OUT BEFORE YOU GOT MONEY TO BUY MORE.: NEVER TRUE

## 2023-06-07 SDOH — HEALTH STABILITY: PHYSICAL HEALTH: ON AVERAGE, HOW MANY MINUTES DO YOU ENGAGE IN EXERCISE AT THIS LEVEL?: 40 MIN

## 2023-06-07 SDOH — HEALTH STABILITY: PHYSICAL HEALTH: ON AVERAGE, HOW MANY DAYS PER WEEK DO YOU ENGAGE IN MODERATE TO STRENUOUS EXERCISE (LIKE A BRISK WALK)?: 2 DAYS

## 2023-06-07 SDOH — ECONOMIC STABILITY: INCOME INSECURITY: HOW HARD IS IT FOR YOU TO PAY FOR THE VERY BASICS LIKE FOOD, HOUSING, MEDICAL CARE, AND HEATING?: NOT VERY HARD

## 2023-06-07 ASSESSMENT — PAIN SCALES - GENERAL: PAINLEVEL: MILD PAIN (2)

## 2023-06-07 ASSESSMENT — ENCOUNTER SYMPTOMS
DYSURIA: 0
HEMATOCHEZIA: 0
NAUSEA: 0
BREAST MASS: 0
HEADACHES: 0
DIZZINESS: 0
PALPITATIONS: 0
HEARTBURN: 0
ARTHRALGIAS: 1
ABDOMINAL PAIN: 0
HEMATURIA: 0
COUGH: 1
SHORTNESS OF BREATH: 1
MYALGIAS: 1
FREQUENCY: 0
NERVOUS/ANXIOUS: 0
JOINT SWELLING: 0
WEAKNESS: 0
PARESTHESIAS: 0
FEVER: 0
EYE PAIN: 0
DIARRHEA: 0
CHILLS: 0
SORE THROAT: 0
CONSTIPATION: 0

## 2023-06-07 ASSESSMENT — LIFESTYLE VARIABLES
HOW OFTEN DO YOU HAVE SIX OR MORE DRINKS ON ONE OCCASION: NEVER
HOW MANY STANDARD DRINKS CONTAINING ALCOHOL DO YOU HAVE ON A TYPICAL DAY: 1 OR 2
AUDIT-C TOTAL SCORE: 2
SKIP TO QUESTIONS 9-10: 1
HOW OFTEN DO YOU HAVE A DRINK CONTAINING ALCOHOL: 2-4 TIMES A MONTH

## 2023-06-07 ASSESSMENT — SOCIAL DETERMINANTS OF HEALTH (SDOH)
IN A TYPICAL WEEK, HOW MANY TIMES DO YOU TALK ON THE PHONE WITH FAMILY, FRIENDS, OR NEIGHBORS?: MORE THAN THREE TIMES A WEEK
HOW OFTEN DO YOU ATTEND CHURCH OR RELIGIOUS SERVICES?: MORE THAN 4 TIMES PER YEAR
DO YOU BELONG TO ANY CLUBS OR ORGANIZATIONS SUCH AS CHURCH GROUPS UNIONS, FRATERNAL OR ATHLETIC GROUPS, OR SCHOOL GROUPS?: NO
HOW OFTEN DO YOU GET TOGETHER WITH FRIENDS OR RELATIVES?: TWICE A WEEK

## 2023-06-07 NOTE — Clinical Note
Colonoscopy - had one through [facility] (9/28/2021), found a polyp (TA). Repeat colonoscopy in 7 years for Polyp surveillance.

## 2023-06-07 NOTE — PROGRESS NOTES
SUBJECTIVE:   CC: Lisa is an 62 year old who presents for preventive health visit.       6/7/2023     9:29 AM   Additional Questions   Roomed by Sena JEFFERY   Accompanied by Self         6/7/2023     9:29 AM   Patient Reported Additional Medications   Patient reports taking the following new medications n/a     Healthy Habits:     Getting at least 3 servings of Calcium per day:  NO    Bi-annual eye exam:  Yes    Dental care twice a year:  Yes    Sleep apnea or symptoms of sleep apnea:  None    Diet:  Regular (no restrictions)    Frequency of exercise:  2-3 days/week    Duration of exercise:  15-30 minutes    Taking medications regularly:  No    Barriers to taking medications:  Problems remembering to take them and Other    PHQ-2 Total Score: 0    Additional concerns today:  Yes    Would like to establish primary care.     History of shortness of breath and chest tightness (worse when doing things like pulling her suitcase) - has had comprehensive work-up for this that is negative for cardiac arigin. Possibly attributed to underlying asthma.     Hypothyroidism - taking levothyroxine 112 mcg daily.     Meloxicam, takes for back flare ups typically about 4x/month. Was in PT half of last year but hasn't been since [place].     History of breast cancer - diagnosed 12/2020, s/p double mastectomy, stage 1 lobular estrogen + HER-. Follows with breast surgeon once yearly, oncologist twice annually and she sees them for DEXAs and clinical breast exams.     Colonoscopy - had one through [facility] (9/28/2021), found a polyp (TA). Repeat colonoscopy in 7 years for Polyp surveillance.     Reports she has had two shingrix vaccines.   6/23/20 at [facility]  9/13/2019 at [facility]    Today's PHQ-2 Score:       6/7/2023     9:19 AM   PHQ-2 ( 1999 Pf[employer])   Q1: Little interest or pleasure in doing things 0   Q2: Feeling down, depressed or hopeless 0   PHQ-2 Score 0   Q1: Little interest or pleasure in doing things Not at all   Q2: Feeling  down, depressed or hopeless Not at all   PHQ-2 Score 0       Have you ever done Advance Care Planning? (For example, a Health Directive, POLST, or a discussion with a medical provider or your loved ones about your wishes): Yes, patient states has an Advance Care Planning document and will bring a copy to the clinic.    Social History     Tobacco Use     Smoking status: Never     Smokeless tobacco: Never   Vaping Use     Vaping status: Never Used   Substance Use Topics     Alcohol use: Yes     Comment: 4-5 drinks per week           2023     9:19 AM   Alcohol Use   Prescreen: >3 drinks/day or >7 drinks/week? No     Reviewed orders with patient.  Reviewed health maintenance and updated orders accordingly - Yes  BP Readings from Last 3 Encounters:   23 110/62   21 99/53   08/10/15 106/74    Wt Readings from Last 3 Encounters:   23 71.7 kg (158 lb)   21 72.6 kg (160 lb)   08/10/15 72.2 kg (159 lb 3.2 oz)                    Breast Cancer Screenin/7/2023     9:25 AM   Breast CA Risk Assessment (FHS-7)   Do you have a family history of breast, colon, or ovarian cancer? No / Unknown         Mammogram Screening: Recommended mammography every 1-2 years with patient discussion and risk factor consideration  Pertinent mammograms are reviewed under the imaging tab.    History of abnormal Pap smear: NO - age 30-65 PAP every 5 years with negative HPV co-testing recommended      2022    11:41 AM   PAP / HPV   PAP Negative for Intraepithelial Lesion or Malignancy (NILM)       Reviewed and updated as needed this visit by clinical staff   Tobacco  Allergies               Reviewed and updated as needed this visit by Provider                 Patient Active Problem List   Diagnosis     Shortness of breath (aka SOB)     Chest pain     Lyme disease     Hyperlipidemia     Hypothyroidism     Chronic bilateral low back pain without sciatica     Malignant neoplasm of right female breast (H)     Past  Medical History:   Diagnosis Date     Chest pain      Hyperlipidemia     not on medications     Hypothyroidism     synthroid     Malignant neoplasm of right female breast (H) 06/07/2023    Dx 12/2020- s/p double mastectomy. Stage 1 lobular estrogen + HER -.       Shortness of breath (aka SOB)      Past Surgical History:   Procedure Laterality Date     COLONOSCOPY  01/20/2012    Procedure:COLONOSCOPY; COLONOSCOPY ; Surgeon:RENEE PRUITT; Location: GI     GYN SURGERY  01/01/1999    tubal ligation     KNEE SURGERY      meniscus     ORTHOPEDIC SURGERY      bilateral foot surgery-bunions     Family History   Problem Relation Age of Onset     High cholesterol Mother      Coronary Artery Disease Father      Hypertension Father      Cerebrovascular Disease Paternal Grandmother      Diabetes Son      Cancer Maternal Grandmother         leukemia     Cancer Maternal Grandfather         stomache     Cerebrovascular Disease Paternal Grandfather      Asthma Daughter      Asthma Son      Social History     Socioeconomic History     Marital status:      Spouse name: Not on file     Number of children: Not on file     Years of education: Not on file     Highest education level: Not on file   Occupational History     Not on file   Tobacco Use     Smoking status: Never     Smokeless tobacco: Never   Vaping Use     Vaping status: Never Used   Substance and Sexual Activity     Alcohol use: Yes     Comment: 2-3 drinks per week     Drug use: Never     Sexual activity: Yes     Partners: Male   Other Topics Concern     Parent/sibling w/ CABG, MI or angioplasty before 65F 55M? Not Asked      Service Not Asked     Blood Transfusions Not Asked     Caffeine Concern No     Comment: 1 cup coffee      Occupational Exposure Not Asked     Hobby Hazards Not Asked     Sleep Concern No     Comment: varies      Stress Concern Not Asked     Weight Concern Not Asked     Special Diet No     Back Care Not Asked     Exercise Yes      Comment: walking 2 days a week, some weights      Bike Helmet Not Asked     Seat Belt Yes     Self-Exams Not Asked   Social History Narrative    Lives in [place].     Three children. Dtr 33, dtr 28, son 25. Oldest is getting  8/4. Son has type 1 DM.     Works for [employer] x 39 years, .         Mindy Jacobo, DNP, APRN, CNP    06/07/23                 Social Determinants of Health     Financial Resource Strain: Low Risk  (6/7/2023)    Overall Financial Resource Strain (CARDIA)      Difficulty of Paying Living Expenses: Not very hard   Food Insecurity: No Food Insecurity (6/7/2023)    Hunger Vital Sign      Worried About Running Out of Food in the Last Year: Never true      Ran Out of Food in the Last Year: Never true   Transportation Needs: No Transportation Needs (6/7/2023)    PRAPARE - Transportation      Lack of Transportation (Medical): No      Lack of Transportation (Non-Medical): No   Physical Activity: Insufficiently Active (6/7/2023)    Exercise Vital Sign      Days of Exercise per Week: 2 days      Minutes of Exercise per Session: 40 min   Stress: No Stress Concern Present (6/7/2023)    [employer] - Occupational Stress Questionnaire      Feeling of Stress : Only a little   Social Connections: Moderately Integrated (6/7/2023)    Social Connection and Isolation Panel [NHANES]      Frequency of Communication with Friends and Family: More than three times a week      Frequency of Social Gatherings with Friends and Family: Twice a week      Attends Latter day Services: More than 4 times per year      Active Member of Clubs or Organizations: No      Attends Club or Organization Meetings: Not on file      Marital Status:    Intimate Partner Violence: Not on file   Housing Stability: Low Risk  (6/7/2023)    Housing Stability Vital Sign      Unable to Pay for Housing in the Last Year: No      Number of Places Lived in the Last Year: 1       "Unstable Housing in the Last Year: No     Current Outpatient Medications   Medication     Calcium-Magnesium 250-155 MG TABS     levothyroxine (SYNTHROID/LEVOTHROID) 112 MCG tablet     meloxicam (MOBIC) 7.5 MG tablet     UNABLE TO FIND     letrozole (FEMARA) 2.5 MG tablet     No current facility-administered medications for this visit.        Allergies   Allergen Reactions     Codeine Sulfate Nausea     Norco [Hydrocodone-Acetaminophen]      Felt like it was hard to swallow         Review of Systems   Constitutional: Negative for chills and fever.   HENT: Positive for congestion. Negative for ear pain, hearing loss and sore throat.    Eyes: Negative for pain and visual disturbance.   Respiratory: Positive for cough and shortness of breath.    Cardiovascular: Negative for chest pain, palpitations and peripheral edema.   Gastrointestinal: Negative for abdominal pain, constipation, diarrhea, heartburn, hematochezia and nausea.   Breasts:  Negative for tenderness, breast mass and discharge.   Genitourinary: Negative for dysuria, frequency, genital sores, hematuria, pelvic pain, urgency, vaginal bleeding and vaginal discharge.   Musculoskeletal: Positive for arthralgias and myalgias. Negative for joint swelling.   Skin: Negative for rash.   Neurological: Negative for dizziness, weakness, headaches and paresthesias.   Psychiatric/Behavioral: Negative for mood changes. The patient is not nervous/anxious.           OBJECTIVE:   /62   Pulse 63   Temp 98.2  F (36.8  C) (Tympanic)   Resp 16   Ht 1.619 m (5' 3.75\")   Wt 71.7 kg (158 lb)   SpO2 98%   BMI 27.33 kg/m    Physical Exam  Constitutional: appears to be in no acute distress, comfortable, pleasant.   Eyes: anicteric, conjunctiva clear without drainage or erythema. JORGE LUIS.   Ears, Nose and Throat: tympanic membranes gray with LR,  nose without nasal discharge. OP: no erythema to posterior pharynx, negative post nasal drainage, tonsils +1 no erythema or " exudate.  Neck: supple, thyroid palpable,not enlarged, no nodules   Breast: Exam deferred (deferred after discussion of exam options with patient, no symptoms or concerns).   Cardiovascular: regular rate and rhythm, normal S1 and S2, no murmurs, rubs or gallops, peripheral pulses full and symmetric; negative peripheral edema   Respiratory: Air entry throughout. Breathing pattern unlabored without the use of accessory muscles. Clear to auscultation A and P, no wheezes or crackles, normal breath sounds.    Gastrointestinal: rounded, soft. Positive bowel sounds x4, nontender, no masses.   Genitourinary: Exam deferred (deferred after discussion of exam options with patient, no symptoms or concerns, pap is up to date).   Musculoskeletal: full range of motion, no edema.   Skin: pink, turgor smooth and elastic. Negative for lesions or dryness.  Neurological: normal gait, no tremor.   Psychological: appropriate mood and affect.   Lymphatic: no cervical, axillary, supraclavicular, or infraclavicular lymphadenopathy.    Diagnostic Test Results:  Labs reviewed in Epic    ASSESSMENT/PLAN:   (Z00.00) Routine general medical examination at a health care facility  (primary encounter diagnosis)  Age appropriate screening and preventative care have been addressed today. Vaccinations have been reviewed and are up to date. Patient has been advised to follow a balanced diet with adequate calcium and vitamin D. They have been advised to undertake routine aerobic activity and they were counseled on healthy weight maintenance. Colonoscopy has been reviewed and is up to date at this time. Recommend annual vision exams as well as biannual dental exams. They will follow up for annual physical again in one year.   - Comprehensive metabolic panel (BMP + Alb, Alk Phos, ALT, AST, Total. Bili, TP)  - Lipid panel reflex to direct LDL Fasting  - Pap - utd  - Mammo - no longer indicated, s/p mastectomy  - Colonoscopy - utd  - DEXA - utd, managed  through oncologist          (C50.241) Malignant neoplasm of right female breast, unspecified estrogen receptor status, unspecified site of breast (H)  History of breast cancer - diagnosed 12/2020, s/p double mastectomy, stage 1 lobular estrogen + HER-. Follows with breast surgeon once yearly, oncologist twice annually and she sees them for DEXAs and clinical breast exams. Has been recommended to start zoledronic acid but she is hesitant about this due to the side effects.     (E03.9) Hypothyroidism, unspecified type  Taking levothyroxine 112 mcg daily, recheck TSH/T4 today and adjust medication as indicated.   - TSH with free T4 reflex  - OFFICE/OUTPT VISIT,EST,LEVL III         (M54.50,  G89.29) Chronic bilateral low back pain without sciatica  Chronic, stable. Meloxicam, takes for back flare ups typically about 4x/month. Was in PT half of last year but hasn't been since christmas.     (R06.02) Shortness of breath (aka SOB)  History of shortness of breath and chest tightness (worse when doing things like pulling her suitcase) - has had comprehensive work-up for this that is negative for cardiac arigin. Possibly attributed to underlying asthma.     (E78.5) Hyperlipidemia, unspecified hyperlipidemia type  - Lipid panel reflex to direct LDL Fasting    (Z11.4) Screening for HIV (human immunodeficiency virus)  - HIV Antigen Antibody Combo         (Z11.59) Need for hepatitis C screening test  - Hepatitis C Screen Reflex to HCV RNA Quant and Genotype           Follow-up: Lab results pending, will follow-up as indicated after reviewing results.         COUNSELING:  Reviewed preventive health counseling, as reflected in patient instructions  Special attention given to:        Regular exercise       Healthy diet/nutrition       Vision screening       Immunizations       Osteoporosis prevention/bone health       Colorectal Cancer Screening       Consider Hep C screening for all patients one time for ages 18-79 years       HIV  screeninx in teen years, 1x in adult years, and at intervals if high risk      She reports that she has never smoked. She has never used smokeless tobacco.             JACKLYN Rincon CNP [facility]

## 2023-11-17 ENCOUNTER — TELEPHONE (OUTPATIENT)
Dept: PEDIATRICS | Facility: CLINIC | Age: 63
End: 2023-11-17
Payer: COMMERCIAL

## 2023-11-17 NOTE — TELEPHONE ENCOUNTER
Order/Referral Request    Who is requesting: Patient    Orders being requested: liver, kidney and thyroid labs as well as any others that were out of range during her last blood draw    Reason service is needed/diagnosis: routine- management    When are orders needed by: December 2023    Has this been discussed with Provider: Yes    Does patient have a preference on a Group/Provider/Facility? [facility]    Does patient have an appointment scheduled?: Yes: December 19    Where to send orders: Place orders within Epic    Could we send this information to you in [place] or would you prefer to receive a phone call?:   Patient would prefer a phone call   Okay to leave a detailed message?: Yes at Cell number on file:    Telephone Information:   Mobile [phone]

## 2023-11-20 NOTE — TELEPHONE ENCOUNTER
Left message for patient. Provider had sent her a message that these lab tests can be done annually.   Lissette [facility]

## 2023-11-21 ENCOUNTER — DOCUMENTATION ONLY (OUTPATIENT)
Dept: LAB | Facility: CLINIC | Age: 63
End: 2023-11-21
Payer: COMMERCIAL

## 2023-11-21 DIAGNOSIS — E03.9 HYPOTHYROIDISM, UNSPECIFIED TYPE: Primary | ICD-10-CM

## 2023-11-21 DIAGNOSIS — E55.9 VITAMIN D DEFICIENCY, UNSPECIFIED: ICD-10-CM

## 2023-11-21 DIAGNOSIS — R74.8 ELEVATED ALKALINE PHOSPHATASE LEVEL: ICD-10-CM

## 2023-11-21 DIAGNOSIS — Z85.3 PERSONAL HISTORY OF MALIGNANT NEOPLASM OF BREAST: ICD-10-CM

## 2023-11-21 NOTE — PROGRESS NOTES
Lisa Wilson has an upcoming lab appointment:    Future Appointments   Date Time Provider Department [place]   12/19/2023  9:00 AM DONNA LAB [facility]    12/19/2023 10:00 AM DONNA LAW/LPN [facility]   12/28/2023  9:00 AM Mindy Jacobo APRN CNP [facility]   1/24/2024  1:30 PM Mindy Jacobo APRN CNP [facility]     Patient is scheduled for the following lab(s):   Scheduling Notes: liver, kidney and throid labs (and any others ordered by Odalis)   Made On: 11/17/2023 9:27 AM By: JOY TUCKER       There is no order available. Please review and place either future orders or HMPO (Review of Health Maintenance Protocol Orders), as appropriate.    Health Maintenance Due   Topic    ANNUAL REVIEW OF HM ORDERS      Missy Perez

## 2023-12-21 ENCOUNTER — NURSE TRIAGE (OUTPATIENT)
Dept: PEDIATRICS | Facility: CLINIC | Age: 63
End: 2023-12-21
Payer: COMMERCIAL

## 2023-12-21 NOTE — TELEPHONE ENCOUNTER
Nurse Triage SBAR    Is this a 2nd Level Triage? NO    Situation: Pt calls to inquire if she needs to reschedule lab apps, and reports vaginal itching.    Background: Pt reports vaginal itching intermittently for a while. Pt was unable to specify how long. Pt states she has been having these symptoms since her OBGYN clinic prescribed her diflucan. Pt reports when she was given the diflucan for a yeast infect, she only took one dose. Pt reports she will call her OBGYN clinic for instructions on her past script, and if she should continue it.     Pt reports [place] .     Informed pt of provider result note for past labs stating she may continue labs annually and she does not need to reschedule.     Assessment: Pt endorses dysuria occurrence yesterday. Pt denies hematuria, frequency and urgency. Pt does not provide more details or allow more questions as she will be calling her OBGYN instead.    Protocol Recommended Disposition:   No disposition on file.    Recommendation: Advised patient that her OBGYN clinic may advise her, but might not be able to provide care out of state. Recommended pt be seen and evaluated at location near her like a local urgent care. Pt verbalized understanding and agrees with plan.    Michel Bro RN on 12/21/2023 at 8:26 AM

## 2023-12-24 ENCOUNTER — OFFICE VISIT (OUTPATIENT)
Dept: URGENT CARE | Facility: URGENT CARE | Age: 63
End: 2023-12-24
Payer: COMMERCIAL

## 2023-12-24 VITALS
BODY MASS INDEX: 27.33 KG/M2 | SYSTOLIC BLOOD PRESSURE: 114 MMHG | HEART RATE: 69 BPM | DIASTOLIC BLOOD PRESSURE: 71 MMHG | TEMPERATURE: 97.3 F | OXYGEN SATURATION: 97 % | WEIGHT: 158 LBS

## 2023-12-24 DIAGNOSIS — N89.8 VAGINAL DISCHARGE: ICD-10-CM

## 2023-12-24 DIAGNOSIS — R30.0 DYSURIA: Primary | ICD-10-CM

## 2023-12-24 LAB
CLUE CELLS: ABNORMAL
RBC #/AREA URNS AUTO: NORMAL /HPF
TRICHOMONAS, WET PREP: ABNORMAL
WBC #/AREA URNS AUTO: NORMAL /HPF
WBC'S/HIGH POWER FIELD, WET PREP: ABNORMAL
YEAST, WET PREP: ABNORMAL

## 2023-12-24 PROCEDURE — 87210 SMEAR WET MOUNT SALINE/INK: CPT | Performed by: NURSE PRACTITIONER

## 2023-12-24 PROCEDURE — 81015 MICROSCOPIC EXAM OF URINE: CPT | Performed by: NURSE PRACTITIONER

## 2023-12-24 PROCEDURE — 99213 OFFICE O/P EST LOW 20 MIN: CPT | Performed by: NURSE PRACTITIONER

## 2023-12-24 RX ORDER — FLUCONAZOLE 150 MG/1
150 TABLET ORAL ONCE
Qty: 1 TABLET | Refills: 0 | Status: SHIPPED | OUTPATIENT
Start: 2023-12-24 | End: 2023-12-24

## 2023-12-24 NOTE — PATIENT INSTRUCTIONS
Results for orders placed or performed in visit on 12/24/23   Urine Microscopic Exam     Status: Normal   Result Value Ref Range    RBC Urine 0-2 0-2 /HPF /HPF    WBC Urine 0-5 0-5 /HPF /HPF   Wet prep - Clinic Collect     Status: Abnormal    Specimen: Vagina; Swab   Result Value Ref Range    Trichomonas Absent Absent    Yeast Absent Absent    Clue Cells Absent Absent    WBCs/high power field 3+ (A) None         Wet prep and UA negative  Push fluids  Butt paste cream.    Mix equal parts hydrocortisone, desitin and lotrimin in a covered container.  Apply liberally to affected area several times a day.    F/u in 1 week if persists or 3 days if worsening.

## 2023-12-24 NOTE — PROGRESS NOTES
Assessment & Plan     Dysuria  - Urine Microscopic Exam  - Urine Microscopic Exam  - fluconazole (DIFLUCAN) 150 MG tablet  Dispense: 1 tablet; Refill: 0    Vaginal discharge  - Wet prep - Clinic Collect  - Urine Microscopic Exam  - Urine Microscopic Exam  - fluconazole (DIFLUCAN) 150 MG tablet  Dispense: 1 tablet; Refill: 0     Patient Instructions     Results for orders placed or performed in visit on 12/24/23   Urine Microscopic Exam     Status: Normal   Result Value Ref Range    RBC Urine 0-2 0-2 /HPF /HPF    WBC Urine 0-5 0-5 /HPF /HPF   Wet prep - Clinic Collect     Status: Abnormal    Specimen: Vagina; Swab   Result Value Ref Range    Trichomonas Absent Absent    Yeast Absent Absent    Clue Cells Absent Absent    WBCs/high power field 3+ (A) None         Wet prep and UA negative  Push fluids  Butt paste cream.    Mix equal parts hydrocortisone, desitin and lotrimin in a covered container.  Apply liberally to affected area several times a day.    F/u in 1 week if persists or 3 days if worsening.         Return in about 1 week (around 12/31/2023) for with regular provider if symptoms persist.    JACKLYN Martinez [facility] LENCHO Gonzalez is a 63 year old female who presents to clinic today for the following health issues:  Chief Complaint   Patient presents with    Urgent Care    UTI     Painful itching, burning when urinates. Pt did use a azo      HPI    GYN Complaint    Onset of symptoms was 1 week(s) ago.  Course of illness is waxing and waning.    Severity moderate  Current and Associated symptoms: vaginal pain -  dull, local irritation, vulvar itching, and burning  Treatment measures tried include:  diflucan and azo  Sexually active: no  Predisposing factors: None  Hx of previous symptom: none    Review of Systems  Constitutional, HEENT, cardiovascular, pulmonary, GI, , musculoskeletal, neuro, skin, endocrine and psych systems are negative, except as  otherwise noted.      Objective    /71   Pulse 69   Temp 97.3  F (36.3  C) (Tympanic)   Wt 71.7 kg (158 lb)   SpO2 97%   BMI 27.33 kg/m    Physical Exam   GENERAL: healthy, alert and no distress  RESP: lungs clear to auscultation - no rales, rhonchi or wheezes  CV: regular rate and rhythm, normal S1 S2, no S3 or S4, no murmur, click or rub, no peripheral edema and peripheral pulses strong  SKIN: no suspicious lesions or rashes

## 2023-12-28 ENCOUNTER — VIRTUAL VISIT (OUTPATIENT)
Dept: PEDIATRICS | Facility: CLINIC | Age: 63
End: 2023-12-28
Payer: COMMERCIAL

## 2023-12-28 DIAGNOSIS — H04.203 WATERY EYES: ICD-10-CM

## 2023-12-28 DIAGNOSIS — F41.9 ANXIETY: ICD-10-CM

## 2023-12-28 DIAGNOSIS — F32.A DEPRESSION, UNSPECIFIED DEPRESSION TYPE: ICD-10-CM

## 2023-12-28 DIAGNOSIS — N89.8 VAGINAL ITCHING: ICD-10-CM

## 2023-12-28 DIAGNOSIS — M79.672 BILATERAL FOOT PAIN: Primary | ICD-10-CM

## 2023-12-28 DIAGNOSIS — E03.9 HYPOTHYROIDISM, UNSPECIFIED TYPE: ICD-10-CM

## 2023-12-28 DIAGNOSIS — M79.671 BILATERAL FOOT PAIN: Primary | ICD-10-CM

## 2023-12-28 PROCEDURE — 99214 OFFICE O/P EST MOD 30 MIN: CPT | Mod: VID | Performed by: NURSE PRACTITIONER

## 2023-12-28 RX ORDER — SERTRALINE HYDROCHLORIDE 25 MG/1
25 TABLET, FILM COATED ORAL DAILY
Qty: 30 TABLET | Refills: 1 | Status: SHIPPED | OUTPATIENT
Start: 2023-12-28 | End: 2024-02-28

## 2023-12-28 NOTE — PATIENT INSTRUCTIONS
For watery eyes:  - Look for an antihistamine eye drop such as zaditor. You can find this over the counter.   - You can also try taking an oral allergy pill. I recommend zyrtec 10 mg daily.

## 2023-12-28 NOTE — PROGRESS NOTES
Lisa is a 63 year old who is being evaluated via a billable video visit.      How would you like to obtain your AVS? MyChart  If the video visit is dropped, the invitation should be resent by: Text to cell phone: [phone]  Will anyone else be joining your video visit? No          Assessment & Plan     Bilateral foot pain  Possibly arthritis. Continue topical analgesics. Referral to podiatry.   - Orthopedic  Referral; Future    Hypothyroidism, unspecified type  Last checked 6 months ago and was normal. Recheck due to current symptoms.   - TSH with free T4 reflex; Future    Watery eyes  Trial of antihistamine, recommend ophthalmic antihistamine such as Zaditor or oral cetrizine.   - TSH with free T4 reflex; Future    Depression, unspecified depression type  Anxiety  Start sertraline 25 mg daily. Follow-up in one month at time of pre-op exam. Discussed administration and side effects to monitor for.   - sertraline (ZOLOFT) 25 MG tablet; Take 1 tablet (25 mg) by mouth daily    Vaginal itching  Reviewed recent wet prep results. Would defer vagina estrogen to her gyn/onc given breast ca history. She has upcoming appt.       33 minutes spent by me on the date of the encounter doing chart review, review of test results, interpretation of tests, patient visit, and documentation          MEDICATIONS:        - Start taking sertraline, cetirizine, zaditor       - Continue other medications without change  CONSULTATION/REFERRAL to podiatry  FUTURE LABS:       - Schedule a non-fasting blood draw at patient's convenience.    JACKLYN Rincon CNP  [facility]    Moises Gonzalez is a 63 year old, presenting for the following health issues:  No chief complaint on file.      HPI     Presents with a couple of concerns.     #foot pain  Pain to top of both feet x 6 months. Not everyday. No swelling or color changes. Has tried topical analgesics including volaren gel, usually puts on at night and  so can't tell if it helps. Has tried ice and heat. Worse at the end of the day. Works as .    #watery eyes  Chronic issue. Has a hard time keeping eye make-up on to the point where she is considering getting permanent eye liner. Not itchy. No known allergies.    #anxiety  #depression  Her whole family will tell her she has ADHD but she had testing at one point that declared she did not. She isn't sure why she seems to be struggling with anxiety and depression. Gets overwhelmed easily. Usually anxiety > depression. Parents being gone vs unhappiness with her job vs finding new direction in life. She doesn't feel like she has time to see a therapist. She was on medication in the past, stopped so that she could qualify for short term disability at work. She can't recall what she was taking, possibly buspirone. She recalls being on prozac at one point and didn't like it. Anxiety runs in her family.     #vaginal itching  Recent visits in  for this, wet prep negative for yeast. Used AZO OTC and symptoms resolved. She still has it intermittently, ongoing for a couple months.     Patient Active Problem List   Diagnosis    Shortness of breath (aka SOB)    Chest pain    Lyme disease    Hyperlipidemia    Hypothyroidism    Chronic bilateral low back pain without sciatica    Malignant neoplasm of right female breast (H)     Past Medical History:   Diagnosis Date    Chest pain     Hyperlipidemia     not on medications    Hypothyroidism     synthroid    Malignant neoplasm of right female breast (H) 06/07/2023    Dx 12/2020- s/p double mastectomy. Stage 1 lobular estrogen + HER -.      Shortness of breath (aka SOB)      Current Outpatient Medications   Medication    Calcium-Magnesium 250-155 MG TABS    letrozole (FEMARA) 2.5 MG tablet    levothyroxine (SYNTHROID/LEVOTHROID) 112 MCG tablet    meloxicam (MOBIC) 7.5 MG tablet    UNABLE TO FIND     No current facility-administered medications for this visit.         Allergies   Allergen Reactions    Codeine Sulfate Nausea    Norco [Hydrocodone-Acetaminophen]      Felt like it was hard to swallow         Review of Systems    ROS: 10 point ROS neg other than the symptoms noted above in the HPI.        Objective       Vitals:  No vitals were obtained today due to virtual visit.    Physical Exam   GENERAL: Healthy, alert and no distress  EYES: Eyes grossly normal to inspection.  No discharge or erythema, or obvious scleral/conjunctival abnormalities.  RESP: No audible wheeze, cough, or visible cyanosis.  No visible retractions or increased work of breathing.    SKIN: Visible skin clear. No significant rash, abnormal pigmentation or lesions.  NEURO: Cranial nerves grossly intact.  Mentation and speech appropriate for age.  PSYCH: Mentation appears normal, affect normal/bright, judgement and insight intact, normal speech and appearance well-groomed.        Video-Visit Details    Type of service:  Video Visit     Originating Location (pt. Location): Home    Distant Location (provider location):  Off-site  Platform used for Video Visit: Yumiko

## 2024-01-15 ENCOUNTER — OFFICE VISIT (OUTPATIENT)
Dept: PODIATRY | Facility: CLINIC | Age: 64
End: 2024-01-15
Payer: COMMERCIAL

## 2024-01-15 VITALS — DIASTOLIC BLOOD PRESSURE: 72 MMHG | WEIGHT: 158 LBS | SYSTOLIC BLOOD PRESSURE: 109 MMHG | BODY MASS INDEX: 27.33 KG/M2

## 2024-01-15 DIAGNOSIS — M79.672 BILATERAL FOOT PAIN: ICD-10-CM

## 2024-01-15 DIAGNOSIS — M19.071 ARTHRITIS OF BOTH FEET: Primary | ICD-10-CM

## 2024-01-15 DIAGNOSIS — M79.671 BILATERAL FOOT PAIN: ICD-10-CM

## 2024-01-15 DIAGNOSIS — M19.072 ARTHRITIS OF BOTH FEET: Primary | ICD-10-CM

## 2024-01-15 PROCEDURE — 99203 OFFICE O/P NEW LOW 30 MIN: CPT | Performed by: PODIATRIST

## 2024-01-15 NOTE — LETTER
"    1/15/2024         RE: Lisa Wilson  [address] [address] [facility] [place] [address]        Dear Colleague,    Thank you for referring your patient, Lisa Wilson, to the [facility]. Please see a copy of my visit note below.    Foot & Ankle Surgery  January 15, 2024    CC: \"Top of foot long-term pain\"    I was asked to see Lisa Wilson regarding the chief complaint by:  MADAN VILLASENOR CNP    HPI:  Pt is a 63 year old female who presents with above complaint.  Multiple month history of pain at the dorsal aspect of the foot bilateral, right more severe than left.  Pain 7 out of 10, comes and goes \"most days\", worse with \"rest and at \"night\".  \"Topical painkiller/Voltaren\" for treatment.  The patient states that she has had multiple surgeries including multiple bunion surgeries bilateral.    ROS:   Pos for CC.  The patient denies current nausea, vomiting, chills, fevers, belly pain, calf pain, chest pain or SOB.  Complete remainder of ROS is otherwise neg.    VITALS:  There were no vitals filed for this visit.    PMH:    Past Medical History:   Diagnosis Date     Chest pain      Hyperlipidemia     not on medications     Hypothyroidism     synthroid     Malignant neoplasm of right female breast (H) 06/07/2023    Dx 12/2020- s/p double mastectomy. Stage 1 lobular estrogen + HER -.       Shortness of breath (aka SOB)        SXHX:    Past Surgical History:   Procedure Laterality Date     COLONOSCOPY  01/20/2012    Procedure:COLONOSCOPY; COLONOSCOPY ; Surgeon:RENEE PRUITT; Location: GI     GYN SURGERY  01/01/1999    tubal ligation     KNEE SURGERY      meniscus     MASTECTOMY SIMPLE BILATERAL      diagnosed 12/2020, s/p double mastectomy, stage 1 lobular estrogen + HER-     ORTHOPEDIC SURGERY      bilateral foot surgery-bunions        MEDS:    Current Outpatient Medications   Medication     Calcium-Magnesium 250-155 MG TABS     letrozole (FEMARA) 2.5 MG tablet     " levothyroxine (SYNTHROID/LEVOTHROID) 112 MCG tablet     meloxicam (MOBIC) 7.5 MG tablet     sertraline (ZOLOFT) 25 MG tablet     UNABLE TO FIND     No current facility-administered medications for this visit.       ALL:     Allergies   Allergen Reactions     Codeine Sulfate Nausea     Norco [Hydrocodone-Acetaminophen]      Felt like it was hard to swallow       FMH:    Family History   Problem Relation Age of Onset     High cholesterol Mother      Coronary Artery Disease Father      Hypertension Father      Cerebrovascular Disease Paternal Grandmother      Diabetes Son      Cancer Maternal Grandmother         leukemia     Cancer Maternal Grandfather         stomache     Cerebrovascular Disease Paternal Grandfather      Asthma Daughter      Asthma Son        SocHx:    Social History     Socioeconomic History     Marital status:      Spouse name: Not on file     Number of children: Not on file     Years of education: Not on file     Highest education level: Not on file   Occupational History     Not on file   Tobacco Use     Smoking status: Never     Smokeless tobacco: Never   Vaping Use     Vaping Use: Never used   Substance and Sexual Activity     Alcohol use: Yes     Comment: 2-3 drinks per week     Drug use: Never     Sexual activity: Yes     Partners: Male   Other Topics Concern     Parent/sibling w/ CABG, MI or angioplasty before 65F 55M? Not Asked      Service Not Asked     Blood Transfusions Not Asked     Caffeine Concern No     Comment: 1 cup coffee      Occupational Exposure Not Asked     Hobby Hazards Not Asked     Sleep Concern No     Comment: varies      Stress Concern Not Asked     Weight Concern Not Asked     Special Diet No     Back Care Not Asked     Exercise Yes     Comment: walking 2 days a week, some weights      Bike Helmet Not Asked     Seat Belt Yes     Self-Exams Not Asked   Social History Narrative    Lives in [place].     Three children. Dtr 33, dtr 28, son 25. Oldest is  getting  8/4. Son has type 1 DM.     Works for [employer] x 39 years, .         Mindy Jacobo, DNP, APRN, CNP    06/07/23                 Social Determinants of Health     Financial Resource Strain: Low Risk  (6/7/2023)    Overall Financial Resource Strain (CARDIA)      Difficulty of Paying Living Expenses: Not very hard   Food Insecurity: No Food Insecurity (6/7/2023)    Hunger Vital Sign      Worried About Running Out of Food in the Last Year: Never true      Ran Out of Food in the Last Year: Never true   Transportation Needs: No Transportation Needs (6/7/2023)    PRAPARE - Transportation      Lack of Transportation (Medical): No      Lack of Transportation (Non-Medical): No   Physical Activity: Insufficiently Active (6/7/2023)    Exercise Vital Sign      Days of Exercise per Week: 2 days      Minutes of Exercise per Session: 40 min   Stress: No Stress Concern Present (6/7/2023)    [place] [employer] - Occupational Stress Questionnaire      Feeling of Stress : Only a little   Social Connections: Moderately Integrated (6/7/2023)    Social Connection and Isolation Panel [NHANES]      Frequency of Communication with Friends and Family: More than three times a week      Frequency of Social Gatherings with Friends and Family: Twice a week      Attends Religion Services: More than 4 times per year      Active Member of Clubs or Organizations: No      Attends Club or Organization Meetings: Not on file      Marital Status:    Interpersonal Safety: Not on file   Housing Stability: Low Risk  (6/7/2023)    Housing Stability Vital Sign      Unable to Pay for Housing in the Last Year: No      Number of Places Lived in the Last Year: 1      Unstable Housing in the Last Year: No           EXAMINATION:  Gen:   No apparent distress  Neuro:   A&Ox3, no deficits  Psych:    Answering questions appropriately for age and situation with normal affect  Head:    NCAT  Eye:     Visual scanning without deficit  Ear:    Response to auditory stimuli wnl  Lung:    Non-labored breathing on RA noted  Abd:    NTND per patient report  Lymph:    Neg for pitting/non-pitting edema BLE  Vasc:    Pulses palpable, CFT minimally delayed  Neuro:    Light touch sensation intact to all sensory nerve distributions without paresthesias  Derm:    Neg for nodules, lesions or ulcerations  MSK:    There is pain on palpation at the dorsal second tarsometatarsal joint bilateral where there is a bony prominence, and there is pain with manipulation of the second tarsometatarsal joints.  Previous bunion and hammertoe surgeries  Calf:    Neg for redness, swelling or tenderness    Assessment:  63 year old female with bilateral midfoot arthritis, focused at the second tarsometatarsal joint, R > L      Medical Decision Making/Plan:  Discussed etiologies, anatomy and options  1.  Bilateral midfoot arthritis, focused at the second tarsometatarsal joint, R>L  -I personally reviewed and interpreted the patient's lower extremity history pertinent to today's visit, including imaging/labs, in preparation for initiating a treatment program.  -Our arthritis handout was dispensed  -Conservatively, we discussed comfortable accommodative shoes and minimizing shoeless walking based on pain  -OTC insert for arch support and stress relief on joints  -RICE/NSAID versus Tylenol as needed based on pain; Voltaren gel as needed  -We discussed padding and shoe gear lacing changes to minimize dorsal pressure  -We discussed the option for image-guided steroid injections.  She will call/MyChart if she would like to proceed  -Surgically, we discussed resection of bone spurs and fusion of involved joints      Follow up: As needed based on above plan or sooner with acute issues      Patient's medical history was reviewed today      Angelo Galicia DPM FACFAS FACFAOM  Podiatric Foot & Ankle Surgeon  [facility]  [phone]    Disclaimer: This note consists of symbols derived from keyboarding, dictation and/or voice recognition software. As a result, there may be errors in the script that have gone undetected. Please consider this when interpreting information found in this chart.          Again, thank you for allowing me to participate in the care of your patient.        Sincerely,        Angelo Galicia DPM, TAVO

## 2024-01-15 NOTE — PATIENT INSTRUCTIONS
Thank you for choosing [facility] Podiatry / Foot & Ankle Surgery!    DR. OLIVARES'S CLINIC LOCATIONS:     [facility] (Friday) TRIAGE LINE: [phone] [address]  APPOINTMENTS: [phone]   [place] [address] RADIOLOGY: [phone]    PHYSICAL THERAPY: [phone]    SET UP SURGERY: [phone]   [facility] (Mon-Tues AM-Thurs) BILLING QUESTIONS: [phone] [address] FAX: [phone]   [place] [address] [place] [facility]: [phone]       You were seen today for bilateral right greater than left midfoot pain.  Your exam is consistent with arthritis of the second tarsometatarsal joint, a very common location for arthritis.    1.  Comfortable supportive shoes;    2.  Insert to help provide support and stability to the midfoot;    3.  Resting/icing/anti-inflammatories (oral and topical).    In addition to the above therapies, consider lacing modification and padding options to minimize pressure on the top of the foot.  If this fails to provide sufficient relief, we will consider image guided steroid injections.  Call/MyChart if he would like to proceed with the steroid injections.  If all else fails and symptoms fail to respond, we would consider surgical intervention including removal of the bone spurs and fusion of the underlying joint.    No scheduled follow-up is needed but do not hesitate to call with any questions.      PRICE Therapy    Many aches and pains throughout the foot and ankle can be helped with many simple treatments.  This is usually described as PRICE Therapy.      P - Protection - often times, inflammation/pain in the lower extremity is not able to improve simply because the areas involved are never allowed to rest.  Every step we take can bother the problematic area.  Protecting those areas is an important step in the healing process.  This may involve a walking cast boot, a special insert/orthotic device, an ankle brace, or simply  "avoiding barefoot walking.    R - Rest - in addition to protecting the foot/ankle, resting is an important, but often times difficult, treatment option.  Getting off your feet when they bother you, and specifically avoiding activities that cause pain/discomfort, are very beneficial to prevent, and treat, foot/ankle pain.  \"If there's something that makes it hurt(eg activities, shoe gear), and you keep doing the thing that makes it hurt, it's just going to keep hurting\".      I - Ice - icing regularly can help to decrease inflammation and swelling in the foot, thus decreasing pain.  Using an ice pack or a bag of frozen peas works very well.  Ice for 20 minutes multiple times per day as needed.  Do not place the ice directly on the skin as this can cause tissue damage.    C - Compression - using a compression wrap or an ACE wrap can help to decrease swelling, which can help to decrease pain.  Wearing the wraps is generally not needed at night, but they should be worn on a regular basis when you are going to be on your feet for prolonged periods as gravity tends to pull fluids down to your feet/ankles.    E - Elevation - elevating your lower extremities multiple times daily for 15-20 minutes can help to decrease swelling, which works well in decreasing pain levels.      NSAID/Tylenol - An anti-inflammatory, like Aleve or ibuprofen, and/or a pain medication, such as Tylenol, can help to improve pain levels and get the issue resolved sooner rather than later.  Also, topical anti-inflammatory medications like Voltaren gel can be used for local treatment, with the benefit of avoiding system issues with oral medications.  Anyone with liver issues should be careful with Tylenol, and anyone with high blood pressure or heart, stomach or kidney issues should be careful with anti-inflammatories.  Please ask if you have questions about these medications, including dosage.        Over the Counter Inserts      Super Feet are the " most common and easiest to find.  Locations include any [employer], [employer] in [place] on [address] and in [place] on [address], [place] in [facility] on [address], [employer] in [facility] on [address], [facility] in [place] on [address], [employer] in [place] on [address] and [employer] in [facility] on [address] and in [place] on [address].    Spenco can be found online and at [place] [employer] in [facility] on [address], [employer] in [facility] on [address], [employer] in [place] on [address], [employer] in [place] on [address] and [employer] in [place] on [address].    Power Step can be a little harder to find.  Locations include [facility] on [address] in [place], [employer] in [place] on [address], [place] in [facility], [employer] in [place] on [address] and online    **  A good high quality over the counter insert can cost around $30-$40.      OSTEOARTHRITIS OF THE FOOT & ANKLE  Osteoarthritis is a condition characterized by the breakdown and eventual loss of cartilage in one or more joints. Cartilage (the connective tissue found at the end of the bones in the joints) protects and cushions the bones during movement. When cartilage deteriorates or is lost, symptoms develop that can restrict one s ability to easily perform daily activities.  Osteoarthritis is also known as degenerative arthritis, reflecting its nature to develop as part of the aging process. As the most common form of arthritis, osteoarthritis affects millions of Americans. Some people refer to osteoarthritis simply as arthritis, even though there are many different types of arthritis.  Osteoarthritis appears at various joints throughout the body, including the hands, feet, spine, hips, and knees. In the foot, the disease most frequently occurs in the big toe, although it is also often found in  the midfoot and ankle.  CAUSES  Osteoarthritis is considered a  wear and tear  disease because the cartilage in the joint wears down with repeated stress and use over time. As the cartilage deteriorates and gets thinner, the bones lose their protective covering and eventually may rub together, causing pain and inflammation of the joint.  An injury may also lead to osteoarthritis, although it may take months or years after the injury for the condition to develop. For example, osteoarthritis in the big toe is often caused by kicking or jamming the toe, or by dropping something on the toe. Osteoarthritis in the midfoot is often caused by dropping something on it, or by a sprain or fracture. In the ankle, osteoarthritis is usually caused by a fracture and occasionally by a severe sprain.  Sometimes osteoarthritis develops as a result of abnormal foot mechanics such as flat feet or high arches. A flat foot causes less stability in the ligaments (bands of tissue that connect bones), resulting in excessive strain on the joints, which can cause arthritis. A high arch is rigid and lacks mobility, causing a jamming of joints that creates an increased risk of arthritis.  SYMPTOMS  People with osteoarthritis in the foot or ankle experience, in varying degrees, one or more of the following: Pain and stiffness in the joint, swelling in or near the joint, or difficulty walking or bending the joint.   Some patients with osteoarthritis also develop a bone spur (a bony protrusion) at the affected joint. Shoe pressure may cause pain at the site of a bone spur, and in some cases blisters or calluses may form over its surface. Bone spurs can also limit the movement of the joint.    DIAGNOSIS  In diagnosing osteoarthritis, the foot and ankle surgeon will examine the foot thoroughly, looking for swelling in the joint, limited mobility, and pain with movement. In some cases, deformity and/or enlargement (spur) of the joint may be noted.  X-rays may be ordered to evaluate the extent of the disease.  NON-SURGICAL TREATMENT  To help relieve symptoms, the surgeon may begin treating osteoarthritis with one or more of the following non-surgical approaches:  Oral medications. Nonsteroidal anti-inflammatory drugs (NSAIDs), such as ibuprofen, are often helpful in reducing the inflammation and pain. Occasionally a prescription for a steroid medication is needed to adequately reduce symptoms.   Orthotic devices. Custom orthotic devices (shoe inserts) are often prescribed to provide support to improve the foot s mechanics or cushioning to help minimize pain.   Bracing. Bracing, which restricts motion and supports the joint, can reduce pain during walking and help prevent further deformity.   Immobilization. Protecting the foot from movement by wearing a cast or removable cast-boot may be necessary to allow the inflammation to resolve.   Steroid injections. In some cases, steroid injections are applied to the affected joint to deliver anti-inflammatory medication.   Physical therapy. Exercises to strengthen the muscles, especially when the osteoarthritis occurs in the ankle, may give the patient greater stability and help avoid injury that might worsen the condition.   DO I NEED SURGERY?  When osteoarthritis has progressed substantially or failed to improve with non-surgical treatment, surgery may be recommended. In advanced cases, surgery may be the only option. The goal of surgery is to decrease pain and improve function. The foot and ankle surgeon will consider a number of factors when selecting the procedure best suited to the patient s condition and lifestyle.

## 2024-01-15 NOTE — PROGRESS NOTES
"Foot & Ankle Surgery  January 15, 2024    CC: \"Top of foot long-term pain\"    I was asked to see Lisa Wilson regarding the chief complaint by:  MADAN VILLASENOR CNP    HPI:  Pt is a 63 year old female who presents with above complaint.  Multiple month history of pain at the dorsal aspect of the foot bilateral, right more severe than left.  Pain 7 out of 10, comes and goes \"most days\", worse with \"rest and at \"night\".  \"Topical painkiller/Voltaren\" for treatment.  The patient states that she has had multiple surgeries including multiple bunion surgeries bilateral.    ROS:   Pos for CC.  The patient denies current nausea, vomiting, chills, fevers, belly pain, calf pain, chest pain or SOB.  Complete remainder of ROS is otherwise neg.    VITALS:  There were no vitals filed for this visit.    PMH:    Past Medical History:   Diagnosis Date    Chest pain     Hyperlipidemia     not on medications    Hypothyroidism     synthroid    Malignant neoplasm of right female breast (H) 06/07/2023    Dx 12/2020- s/p double mastectomy. Stage 1 lobular estrogen + HER -.      Shortness of breath (aka SOB)        SXHX:    Past Surgical History:   Procedure Laterality Date    COLONOSCOPY  01/20/2012    Procedure:COLONOSCOPY; COLONOSCOPY ; Surgeon:RENEE PRUITT; Location: GI    GYN SURGERY  01/01/1999    tubal ligation    KNEE SURGERY      meniscus    MASTECTOMY SIMPLE BILATERAL      diagnosed 12/2020, s/p double mastectomy, stage 1 lobular estrogen + HER-    ORTHOPEDIC SURGERY      bilateral foot surgery-bunions        MEDS:    Current Outpatient Medications   Medication    Calcium-Magnesium 250-155 MG TABS    letrozole (FEMARA) 2.5 MG tablet    levothyroxine (SYNTHROID/LEVOTHROID) 112 MCG tablet    meloxicam (MOBIC) 7.5 MG tablet    sertraline (ZOLOFT) 25 MG tablet    UNABLE TO FIND     No current facility-administered medications for this visit.       ALL:     Allergies   Allergen Reactions    Codeine Sulfate Nausea    Norco " [Hydrocodone-Acetaminophen]      Felt like it was hard to swallow       FMH:    Family History   Problem Relation Age of Onset    High cholesterol Mother     Coronary Artery Disease Father     Hypertension Father     Cerebrovascular Disease Paternal Grandmother     Diabetes Son     Cancer Maternal Grandmother         leukemia    Cancer Maternal Grandfather         stomache    Cerebrovascular Disease Paternal Grandfather     Asthma Daughter     Asthma Son        SocHx:    Social History     Socioeconomic History    Marital status:      Spouse name: Not on file    Number of children: Not on file    Years of education: Not on file    Highest education level: Not on file   Occupational History    Not on file   Tobacco Use    Smoking status: Never    Smokeless tobacco: Never   Vaping Use    Vaping Use: Never used   Substance and Sexual Activity    Alcohol use: Yes     Comment: 2-3 drinks per week    Drug use: Never    Sexual activity: Yes     Partners: Male   Other Topics Concern    Parent/sibling w/ CABG, MI or angioplasty before 65F 55M? Not Asked     Service Not Asked    Blood Transfusions Not Asked    Caffeine Concern No     Comment: 1 cup coffee     Occupational Exposure Not Asked    Hobby Hazards Not Asked    Sleep Concern No     Comment: varies     Stress Concern Not Asked    Weight Concern Not Asked    Special Diet No    Back Care Not Asked    Exercise Yes     Comment: walking 2 days a week, some weights     Bike Helmet Not Asked    Seat Belt Yes    Self-Exams Not Asked   Social History Narrative    Lives in [place].     Three children. Dtr 33, dtr 28, son 25. Oldest is getting  8/4. Son has type 1 DM.     Works for [employer] x 39 years, .         Mindy Jacobo, DNP, APRN, CNP    06/07/23                 Social Determinants of Health     Financial Resource Strain: Low Risk  (6/7/2023)    Overall Financial Resource Strain (CARDIA)     Difficulty of Paying Living  Expenses: Not very hard   Food Insecurity: No Food Insecurity (6/7/2023)    Hunger Vital Sign     Worried About Running Out of Food in the Last Year: Never true     Ran Out of Food in the Last Year: Never true   Transportation Needs: No Transportation Needs (6/7/2023)    PRAPARE - Transportation     Lack of Transportation (Medical): No     Lack of Transportation (Non-Medical): No   Physical Activity: Insufficiently Active (6/7/2023)    Exercise Vital Sign     Days of Exercise per Week: 2 days     Minutes of Exercise per Session: 40 min   Stress: No Stress Concern Present (6/7/2023)    [place] [employer] - Occupational Stress Questionnaire     Feeling of Stress : Only a little   Social Connections: Moderately Integrated (6/7/2023)    Social Connection and Isolation Panel [NHANES]     Frequency of Communication with Friends and Family: More than three times a week     Frequency of Social Gatherings with Friends and Family: Twice a week     Attends Sikh Services: More than 4 times per year     Active Member of Clubs or Organizations: No     Attends Club or Organization Meetings: Not on file     Marital Status:    Interpersonal Safety: Not on file   Housing Stability: Low Risk  (6/7/2023)    Housing Stability Vital Sign     Unable to Pay for Housing in the Last Year: No     Number of Places Lived in the Last Year: 1     Unstable Housing in the Last Year: No           EXAMINATION:  Gen:   No apparent distress  Neuro:   A&Ox3, no deficits  Psych:    Answering questions appropriately for age and situation with normal affect  Head:    NCAT  Eye:    Visual scanning without deficit  Ear:    Response to auditory stimuli wnl  Lung:    Non-labored breathing on RA noted  Abd:    NTND per patient report  Lymph:    Neg for pitting/non-pitting edema BLE  Vasc:    Pulses palpable, CFT minimally delayed  Neuro:    Light touch sensation intact to all sensory nerve distributions without  paresthesias  Derm:    Neg for nodules, lesions or ulcerations  MSK:    There is pain on palpation at the dorsal second tarsometatarsal joint bilateral where there is a bony prominence, and there is pain with manipulation of the second tarsometatarsal joints.  Previous bunion and hammertoe surgeries  Calf:    Neg for redness, swelling or tenderness    Assessment:  63 year old female with bilateral midfoot arthritis, focused at the second tarsometatarsal joint, R > L      Medical Decision Making/Plan:  Discussed etiologies, anatomy and options  1.  Bilateral midfoot arthritis, focused at the second tarsometatarsal joint, R>L  -I personally reviewed and interpreted the patient's lower extremity history pertinent to today's visit, including imaging/labs, in preparation for initiating a treatment program.  -Our arthritis handout was dispensed  -Conservatively, we discussed comfortable accommodative shoes and minimizing shoeless walking based on pain  -OTC insert for arch support and stress relief on joints  -RICE/NSAID versus Tylenol as needed based on pain; Voltaren gel as needed  -We discussed padding and shoe gear lacing changes to minimize dorsal pressure  -We discussed the option for image-guided steroid injections.  She will call/MyChart if she would like to proceed  -Surgically, we discussed resection of bone spurs and fusion of involved joints      Follow up: As needed based on above plan or sooner with acute issues      Patient's medical history was reviewed today      Angelo Galicia DPM FACFAS FACFAOM  Podiatric Foot & Ankle Surgeon  [facility]  [phone]    Disclaimer: This note consists of symbols derived from keyboarding, dictation and/or voice recognition software. As a result, there may be errors in the script that have gone undetected. Please consider this when interpreting information found in this chart.

## 2024-01-24 ENCOUNTER — OFFICE VISIT (OUTPATIENT)
Dept: PEDIATRICS | Facility: CLINIC | Age: 64
End: 2024-01-24
Payer: COMMERCIAL

## 2024-01-24 ENCOUNTER — LAB REQUISITION (OUTPATIENT)
Dept: LAB | Facility: CLINIC | Age: 64
End: 2024-01-24
Payer: COMMERCIAL

## 2024-01-24 VITALS
HEIGHT: 64 IN | WEIGHT: 159 LBS | RESPIRATION RATE: 16 BRPM | BODY MASS INDEX: 27.14 KG/M2 | HEART RATE: 78 BPM | DIASTOLIC BLOOD PRESSURE: 58 MMHG | SYSTOLIC BLOOD PRESSURE: 112 MMHG | OXYGEN SATURATION: 97 % | TEMPERATURE: 97.8 F

## 2024-01-24 DIAGNOSIS — Z23 ENCOUNTER FOR ADMINISTRATION OF VACCINE: ICD-10-CM

## 2024-01-24 DIAGNOSIS — E78.5 HYPERLIPIDEMIA, UNSPECIFIED HYPERLIPIDEMIA TYPE: ICD-10-CM

## 2024-01-24 DIAGNOSIS — Z01.818 PREOPERATIVE EXAMINATION: Primary | ICD-10-CM

## 2024-01-24 DIAGNOSIS — F41.9 ANXIETY: ICD-10-CM

## 2024-01-24 DIAGNOSIS — R06.02 SHORTNESS OF BREATH: ICD-10-CM

## 2024-01-24 DIAGNOSIS — C50.911 MALIGNANT NEOPLASM OF RIGHT FEMALE BREAST, UNSPECIFIED ESTROGEN RECEPTOR STATUS, UNSPECIFIED SITE OF BREAST (H): ICD-10-CM

## 2024-01-24 DIAGNOSIS — F32.A DEPRESSION, UNSPECIFIED DEPRESSION TYPE: ICD-10-CM

## 2024-01-24 DIAGNOSIS — E03.9 HYPOTHYROIDISM, UNSPECIFIED TYPE: ICD-10-CM

## 2024-01-24 DIAGNOSIS — Z12.4 ENCOUNTER FOR SCREENING FOR MALIGNANT NEOPLASM OF CERVIX: ICD-10-CM

## 2024-01-24 PROCEDURE — 90480 ADMN SARSCOV2 VAC 1/ONLY CMP: CPT | Performed by: NURSE PRACTITIONER

## 2024-01-24 PROCEDURE — G0123 SCREEN CERV/VAG THIN LAYER: HCPCS | Mod: ORL | Performed by: OBSTETRICS & GYNECOLOGY

## 2024-01-24 PROCEDURE — 99214 OFFICE O/P EST MOD 30 MIN: CPT | Performed by: NURSE PRACTITIONER

## 2024-01-24 PROCEDURE — 91320 SARSCV2 VAC 30MCG TRS-SUC IM: CPT | Performed by: NURSE PRACTITIONER

## 2024-01-24 ASSESSMENT — PAIN SCALES - GENERAL: PAINLEVEL: NO PAIN (0)

## 2024-01-24 NOTE — PROGRESS NOTES
Preoperative Evaluation  [facility] [place]  [address]  [address]  [place] [address]  Phone: [phone]  Fax: [phone]  Primary Provider: Mindy Hunter  Pre-op Performing Provider: MINDY HUNTER  Jan 24, 2024       Lisa is a 63 year old, presenting for the following:  Pre-Op Exam        1/24/2024     1:13 PM   Additional Questions   Roomed by Sena JEFFERY   Accompanied by Self         1/24/2024     1:13 PM   Patient Reported Additional Medications   Patient reports taking the following new medications n/a     Surgical Information  Surgery/Procedure: Right Breast Reconstruction   Surgery Location: [facility]  Surgeon: Dr. Hassan   Surgery Date: 2/6/24  Time of Surgery: TBD   Where patient plans to recover: At home with family  Fax number for surgical facility: [phone]    Assessment & Plan     The proposed surgical procedure is considered INTERMEDIATE risk.    Preoperative examination  Malignant neoplasm of right female breast, unspecified estrogen receptor status, unspecified site of breast (H)  Medically optimized for surgery.     Shortness of breath (aka SOB)  History of shortness of breath and chest tightness (worse when doing things like pulling her suitcase) - has had comprehensive work-up for this that was negative for cardiac arigin. Possibly attributed to underlying asthma.     Hypothyroidism, unspecified type  Taking levothyroxine 112 mcg daily.     Hyperlipidemia, unspecified hyperlipidemia type  Not requiring cholesterol lowering medications.    Depression, unspecified depression type  Anxiety  Chronic, stable. Taking sertraline 25 mg daily.     Encounter for administration of vaccine  - COVID-19 12+ (2023-24) (PFIZER)            - No identified additional risk factors other than previously addressed    Antiplatelet or Anticoagulation Medication Instructions   - Patient is on no antiplatelet or anticoagulation medications.    Additional  Medication Instructions  Patient is to take all scheduled medications on the day of surgery EXCEPT for modifications listed below:   - meloxicam (Mobic): HOLD 10 days before surgery.     Recommendation  APPROVAL GIVEN to proceed with proposed procedure, without further diagnostic evaluation.      24 minutes spent by me on the date of the encounter doing chart review, patient visit, and documentation     Subjective       HPI related to upcoming procedure:     History of breast cancer - diagnosed 12/2020, s/p double mastectomy, stage 1 lobular estrogen + HER-.         1/24/2024     1:08 PM   Preop Questions   1. Have you ever had a heart attack or stroke? No   2. Have you ever had surgery on your heart or blood vessels, such as a stent placement, a coronary artery bypass, or surgery on an artery in your head, neck, heart, or legs? No   3. Do you have chest pain with activity? YES - chronic chest tightness with activity, previous cardiac work-up has been negative.    4. Do you have a history of  heart failure? No   5. Do you currently have a cold, bronchitis or symptoms of other infection? No   6. Do you have a cough, shortness of breath, or wheezing? YES - chronic SOB, possibly related to underlying asthma.    7. Do you or anyone in your family have previous history of blood clots? No   8. Do you or does anyone in your family have a serious bleeding problem such as prolonged bleeding following surgeries or cuts? No   9. Have you ever had problems with anemia or been told to take iron pills? No   10. Have you had any abnormal blood loss such as black, tarry or bloody stools, or abnormal vaginal bleeding? No   11. Have you ever had a blood transfusion? No   12. Are you willing to have a blood transfusion if it is medically needed before, during, or after your surgery? Yes   13. Have you or any of your relatives ever had problems with anesthesia? No   14. Do you have sleep apnea, excessive snoring or daytime drowsiness?  No   15. Do you have any artifical heart valves or other implanted medical devices like a pacemaker, defibrillator, or continuous glucose monitor? No   16. Do you have artificial joints? No   17. Are you allergic to latex? No       Preoperative Review of    reviewed - no record of controlled substances prescribed.        Patient Active Problem List    Diagnosis Date Noted    Malignant neoplasm of right female breast (H) 06/07/2023     Priority: Medium     Dx 12/2020- s/p double mastectomy. Stage 1 lobular estrogen + HER -.         Chronic bilateral low back pain without sciatica 05/13/2019     Priority: Medium    Shortness of breath (aka SOB)      Priority: Medium    Chest pain      Priority: Medium    Lyme disease      Priority: Medium    Hyperlipidemia      Priority: Medium     not on medications      Hypothyroidism      Priority: Medium     synthroid        Past Medical History:   Diagnosis Date    Chest pain     Hyperlipidemia     not on medications    Hypothyroidism     synthroid    Malignant neoplasm of right female breast (H) 06/07/2023    Dx 12/2020- s/p double mastectomy. Stage 1 lobular estrogen + HER -.      Shortness of breath (aka SOB)      Past Surgical History:   Procedure Laterality Date    COLONOSCOPY  01/20/2012    Procedure:COLONOSCOPY; COLONOSCOPY ; Surgeon:RENEE PRUITT; Location: GI    GYN SURGERY  01/01/1999    tubal ligation    KNEE SURGERY      meniscus    MASTECTOMY SIMPLE BILATERAL      diagnosed 12/2020, s/p double mastectomy, stage 1 lobular estrogen + HER-    ORTHOPEDIC SURGERY      bilateral foot surgery-bunions     Current Outpatient Medications   Medication Sig Dispense Refill    Calcium-Magnesium 250-155 MG TABS Take 2 tablets by mouth daily      letrozole (FEMARA) 2.5 MG tablet Take 1 tablet by mouth daily at 2 pm      levothyroxine (SYNTHROID/LEVOTHROID) 112 MCG tablet Take 1 tablet by mouth daily at 2 pm      sertraline (ZOLOFT) 25 MG tablet Take 1 tablet (25 mg) by  "mouth daily 30 tablet 1    meloxicam (MOBIC) 7.5 MG tablet TAKE 1-2 TABLET(S) BY MOUTH DAILY (Patient not taking: Reported on 1/15/2024)      UNABLE TO FIND Take 1 tablet by mouth daily MEDICATION NAME: viviscal (Patient not taking: Reported on 1/15/2024)         Allergies   Allergen Reactions    Codeine Sulfate Nausea    Norco [Hydrocodone-Acetaminophen]      Felt like it was hard to swallow        Social History     Tobacco Use    Smoking status: Never    Smokeless tobacco: Never   Substance Use Topics    Alcohol use: Yes     Comment: 2-3 drinks per week       History   Drug Use Unknown         Review of Systems   ROS: 10 point ROS neg other than the symptoms noted above in the HPI.    Objective    /58   Pulse 78   Temp 97.8  F (36.6  C) (Tympanic)   Resp 16   Ht 1.619 m (5' 3.75\")   Wt 72.1 kg (159 lb)   LMP  (LMP Unknown)   SpO2 97%   BMI 27.51 kg/m     Estimated body mass index is 27.51 kg/m  as calculated from the following:    Height as of this encounter: 1.619 m (5' 3.75\").    Weight as of this encounter: 72.1 kg (159 lb).  Physical Exam  Constitutional: appears to be in no acute distress, comfortable, pleasant.   Eyes: anicteric, conjunctiva clear without drainage or erythema. JORGE LUIS.   Ears, Nose and Throat: tympanic membranes gray with LR,  nose without nasal discharge. OP: no erythema to posterior pharynx, negative post nasal drainage, tonsils +1 no erythema or exudate.  Neck: supple, thyroid palpable,not enlarged, no nodules   Cardiovascular: regular rate and rhythm, normal S1 and S2, no murmurs, rubs or gallops, peripheral pulses full and symmetric; negative peripheral edema   Respiratory: Air entry throughout. Breathing pattern unlabored without the use of accessory muscles. Clear to auscultation A and P, no wheezes or crackles, normal breath sounds.    Gastrointestinal: rounded, soft. Positive bowel sounds x4, nontender, no masses.   Musculoskeletal: full range of motion, no edema. "   Skin: pink, turgor smooth and elastic. Negative for lesions or dryness.  Neurological: normal gait, no tremor.   Psychological: appropriate mood and affect.   Lymphatic: no cervical, axillary, supraclavicular, or infraclavicular lymphadenopathy.    Recent Labs   Lab Test 06/07/23  1008 12/21/22  1026   HGB  --  14.5   PLT  --  270    140   POTASSIUM 4.2 4.3   CR 0.67 0.76        Diagnostics  No labs were ordered during this visit.   No EKG required, no history of coronary heart disease, significant arrhythmia, peripheral arterial disease or other structural heart disease.    Revised Cardiac Risk Index (RCRI)  The patient has the following serious cardiovascular risks for perioperative complications:   - No serious cardiac risks = 0 points     RCRI Interpretation: 0 points: Class I (very low risk - 0.4% complication rate)       Signed Electronically by: JACKLYN Rincon CNP  Copy of this evaluation report is provided to requesting physician.

## 2024-01-25 RX ORDER — SERTRALINE HYDROCHLORIDE 25 MG/1
25 TABLET, FILM COATED ORAL DAILY
Qty: 30 TABLET | Refills: 1 | OUTPATIENT
Start: 2024-01-25

## 2024-02-28 DIAGNOSIS — F32.A DEPRESSION, UNSPECIFIED DEPRESSION TYPE: ICD-10-CM

## 2024-02-28 DIAGNOSIS — F41.9 ANXIETY: ICD-10-CM

## 2024-02-28 RX ORDER — SERTRALINE HYDROCHLORIDE 25 MG/1
25 TABLET, FILM COATED ORAL DAILY
Qty: 90 TABLET | Refills: 3 | Status: SHIPPED | OUTPATIENT
Start: 2024-02-28 | End: 2024-06-17

## 2024-03-08 ENCOUNTER — TRANSFERRED RECORDS (OUTPATIENT)
Dept: HEALTH INFORMATION MANAGEMENT | Facility: CLINIC | Age: 64
End: 2024-03-08
Payer: COMMERCIAL

## 2024-04-02 ENCOUNTER — TELEPHONE (OUTPATIENT)
Dept: PEDIATRICS | Facility: CLINIC | Age: 64
End: 2024-04-02
Payer: COMMERCIAL

## 2024-04-02 DIAGNOSIS — E03.9 HYPOTHYROIDISM, UNSPECIFIED TYPE: Primary | ICD-10-CM

## 2024-04-02 RX ORDER — LEVOTHYROXINE SODIUM 112 UG/1
112 TABLET ORAL
Qty: 90 TABLET | Refills: 1 | Status: SHIPPED | OUTPATIENT
Start: 2024-04-02

## 2024-04-02 NOTE — TELEPHONE ENCOUNTER
Medication Question or Refill    Contacts         Type Contact Phone/Fax    04/02/2024 08:32 AM CDT Phone (Incoming) Lisa Wilson (Self) [phone] (M)     Pt requesting med refill            What medication are you calling about (include dose and sig)?: levothyroxine (SYNTHROID/LEVOTHROID) 112 MCG tablet     Preferred Pharmacy:   [facility]pharm[id number] - [facility] [address]  [address] [address]  Phone: [phone] Fax: [phone]      Controlled Substance Agreement on file:   CSA -- Patient Level:    CSA: None found at the patient level.       Who prescribed the medication?: Pt's previous primary: Chiara Hogan at [facility], but now goes to [facility] and would like to switch it over for current pcp to start refilling for pt. Pt's pharmacy has it on hold, needs doctor to call in for approval to give to pt under new pcp     Do you need a refill? Yes    When did you use the medication last? Pt has 3 tabs left, pt travels for work and needs it refilled by today as she leaves 04/03/2024    Patient offered an appointment? No    Do you have any questions or concerns?  No      Could we send this information to you in [facility] or would you prefer to receive a phone call?:   Patient would prefer a phone call   Okay to leave a detailed message?: Yes at Cell number on file:    Telephone Information:   Mobile [phone]

## 2024-06-07 ENCOUNTER — LAB (OUTPATIENT)
Dept: LAB | Facility: CLINIC | Age: 64
End: 2024-06-07
Payer: COMMERCIAL

## 2024-06-07 DIAGNOSIS — E03.9 HYPOTHYROIDISM, UNSPECIFIED TYPE: ICD-10-CM

## 2024-06-07 DIAGNOSIS — R74.8 ELEVATED ALKALINE PHOSPHATASE LEVEL: ICD-10-CM

## 2024-06-07 DIAGNOSIS — E55.9 VITAMIN D DEFICIENCY, UNSPECIFIED: ICD-10-CM

## 2024-06-07 DIAGNOSIS — H04.203 WATERY EYES: ICD-10-CM

## 2024-06-07 DIAGNOSIS — Z85.3 PERSONAL HISTORY OF MALIGNANT NEOPLASM OF BREAST: ICD-10-CM

## 2024-06-07 LAB
ALBUMIN SERPL BCG-MCNC: 4.4 G/DL (ref 3.5–5.2)
ALP SERPL-CCNC: 78 U/L (ref 40–150)
ALT SERPL W P-5'-P-CCNC: 28 U/L (ref 0–50)
ANION GAP SERPL CALCULATED.3IONS-SCNC: 11 MMOL/L (ref 7–15)
AST SERPL W P-5'-P-CCNC: 21 U/L (ref 0–45)
BILIRUB SERPL-MCNC: 0.5 MG/DL
BUN SERPL-MCNC: 15.4 MG/DL (ref 8–23)
CALCIUM SERPL-MCNC: 9.4 MG/DL (ref 8.8–10.2)
CHLORIDE SERPL-SCNC: 105 MMOL/L (ref 98–107)
CREAT SERPL-MCNC: 0.64 MG/DL (ref 0.51–0.95)
DEPRECATED HCO3 PLAS-SCNC: 26 MMOL/L (ref 22–29)
EGFRCR SERPLBLD CKD-EPI 2021: >90 ML/MIN/1.73M2
ERYTHROCYTE [DISTWIDTH] IN BLOOD BY AUTOMATED COUNT: 11.8 % (ref 10–15)
GLUCOSE SERPL-MCNC: 91 MG/DL (ref 70–99)
HCT VFR BLD AUTO: 41.4 % (ref 35–47)
HGB BLD-MCNC: 14.2 G/DL (ref 11.7–15.7)
MCH RBC QN AUTO: 30.7 PG (ref 26.5–33)
MCHC RBC AUTO-ENTMCNC: 34.3 G/DL (ref 31.5–36.5)
MCV RBC AUTO: 89 FL (ref 78–100)
PLATELET # BLD AUTO: 253 10E3/UL (ref 150–450)
POTASSIUM SERPL-SCNC: 4.5 MMOL/L (ref 3.4–5.3)
PROT SERPL-MCNC: 7 G/DL (ref 6.4–8.3)
RBC # BLD AUTO: 4.63 10E6/UL (ref 3.8–5.2)
SODIUM SERPL-SCNC: 142 MMOL/L (ref 135–145)
TSH SERPL DL<=0.005 MIU/L-ACNC: 0.33 UIU/ML (ref 0.3–4.2)
VIT D+METAB SERPL-MCNC: 34 NG/ML (ref 20–50)
WBC # BLD AUTO: 5.9 10E3/UL (ref 4–11)

## 2024-06-07 PROCEDURE — 36415 COLL VENOUS BLD VENIPUNCTURE: CPT

## 2024-06-07 PROCEDURE — 80053 COMPREHEN METABOLIC PANEL: CPT

## 2024-06-07 PROCEDURE — 84443 ASSAY THYROID STIM HORMONE: CPT

## 2024-06-07 PROCEDURE — 85027 COMPLETE CBC AUTOMATED: CPT

## 2024-06-07 PROCEDURE — 82306 VITAMIN D 25 HYDROXY: CPT

## 2024-06-07 NOTE — PROGRESS NOTES
Lisa NATALIA Wilson has an upcoming lab appointment:    Future Appointments   Date Time Provider Department [place]   6/7/2024 10:15 AM EA LAB [facility] EA   6/17/2024  2:30 PM Mindy Jacobo APRN CNP [facility] EA     Patient is scheduled for the following lab(s): Lab need Lipid orders,we has the blood at lab    There is no order available. Please review and place either future orders or HMPO (Review of Health Maintenance Protocol Orders), as appropriate.    Health Maintenance Due   Topic    ANNUAL REVIEW OF HM ORDERS     LIPID     TSH W/FREE T4 REFLEX      Eunice Fabian

## 2024-06-17 ENCOUNTER — OFFICE VISIT (OUTPATIENT)
Dept: PEDIATRICS | Facility: CLINIC | Age: 64
End: 2024-06-17
Payer: COMMERCIAL

## 2024-06-17 VITALS
RESPIRATION RATE: 20 BRPM | BODY MASS INDEX: 28.42 KG/M2 | HEIGHT: 63 IN | DIASTOLIC BLOOD PRESSURE: 69 MMHG | OXYGEN SATURATION: 97 % | TEMPERATURE: 97.7 F | WEIGHT: 160.4 LBS | HEART RATE: 72 BPM | SYSTOLIC BLOOD PRESSURE: 107 MMHG

## 2024-06-17 DIAGNOSIS — F32.A DEPRESSION, UNSPECIFIED DEPRESSION TYPE: ICD-10-CM

## 2024-06-17 DIAGNOSIS — L29.2 VULVAR ITCHING: ICD-10-CM

## 2024-06-17 DIAGNOSIS — R07.89 OTHER CHEST PAIN: ICD-10-CM

## 2024-06-17 DIAGNOSIS — F41.9 ANXIETY: Primary | ICD-10-CM

## 2024-06-17 DIAGNOSIS — E78.5 HYPERLIPIDEMIA, UNSPECIFIED HYPERLIPIDEMIA TYPE: ICD-10-CM

## 2024-06-17 PROCEDURE — 99214 OFFICE O/P EST MOD 30 MIN: CPT | Performed by: NURSE PRACTITIONER

## 2024-06-17 RX ORDER — CLOBETASOL PROPIONATE 0.5 MG/G
OINTMENT TOPICAL 2 TIMES DAILY
Qty: 45 G | Refills: 0 | Status: SHIPPED | OUTPATIENT
Start: 2024-06-17

## 2024-06-17 RX ORDER — FLUTICASONE FUROATE AND VILANTEROL 100; 25 UG/1; UG/1
1 POWDER RESPIRATORY (INHALATION) DAILY
Qty: 1 EACH | Refills: 0 | Status: SHIPPED | OUTPATIENT
Start: 2024-06-17 | End: 2024-07-15

## 2024-06-17 ASSESSMENT — ANXIETY QUESTIONNAIRES
5. BEING SO RESTLESS THAT IT IS HARD TO SIT STILL: NOT AT ALL
GAD7 TOTAL SCORE: 5
2. NOT BEING ABLE TO STOP OR CONTROL WORRYING: SEVERAL DAYS
IF YOU CHECKED OFF ANY PROBLEMS ON THIS QUESTIONNAIRE, HOW DIFFICULT HAVE THESE PROBLEMS MADE IT FOR YOU TO DO YOUR WORK, TAKE CARE OF THINGS AT HOME, OR GET ALONG WITH OTHER PEOPLE: SOMEWHAT DIFFICULT
4. TROUBLE RELAXING: SEVERAL DAYS
8. IF YOU CHECKED OFF ANY PROBLEMS, HOW DIFFICULT HAVE THESE MADE IT FOR YOU TO DO YOUR WORK, TAKE CARE OF THINGS AT HOME, OR GET ALONG WITH OTHER PEOPLE?: SOMEWHAT DIFFICULT
GAD7 TOTAL SCORE: 5
6. BECOMING EASILY ANNOYED OR IRRITABLE: NOT AT ALL
3. WORRYING TOO MUCH ABOUT DIFFERENT THINGS: SEVERAL DAYS
7. FEELING AFRAID AS IF SOMETHING AWFUL MIGHT HAPPEN: SEVERAL DAYS
GAD7 TOTAL SCORE: 5
1. FEELING NERVOUS, ANXIOUS, OR ON EDGE: SEVERAL DAYS
7. FEELING AFRAID AS IF SOMETHING AWFUL MIGHT HAPPEN: SEVERAL DAYS

## 2024-06-17 NOTE — PATIENT INSTRUCTIONS
Apply the steroid ointment to the itching areas on your vulva at bedtime, try to use more as needed rather than nightly since we don't know exactly what is causing the itching. I will recommend follow-up with your OB-GYN to discuss further.     Increase sertraline to 50 mg. Follow-up video visit in 4-6 weeks with me.     Come back for fasting labs to recheck your cholesterol.

## 2024-06-17 NOTE — PROGRESS NOTES
Assessment & Plan     Anxiety  Depression, unspecified depression type  Worsening. Anxiety >depression. Increase sertraline to 50 mg with vrt visit in 4-6 weeks.   - sertraline (ZOLOFT) 50 MG tablet; Take 1 tablet (50 mg) by mouth daily    Vulvar itching  She refuses physical gyn exam today. It looks like she had a gyn exam in January but doesn't recall her provider mentioning any abnormalities at that time such as lichen sclerosis. Vaginal estrogen cream was considered but given her history of breast CA on letrozole, would want to verify safety of this medication with oncology - additionally, patient is not keen on the hormonal treatment option. Will trial a high potency steroid cream and recommend follow-up with her gynecologist. Advised on how to use the topical steroid, try to use more on an as needed basis since we don't know exactly what is going on.   - clobetasol (TEMOVATE) 0.05 % external ointment; Apply topically 2 times daily    Other chest pain  Rx sent for trial of breo x 1 month. Will discuss how she is doing at her vrt visit in 4-6 weeks. Consider getting CXR in the future. Continue albuterol as needed.   - fluticasone-vilanterol (BREO ELLIPTA) 100-25 MCG/ACT inhaler; Inhale 1 puff into the lungs daily    Hyperlipidemia, unspecified hyperlipidemia type  Due for lipid recheck.  - Lipid panel reflex to direct LDL Fasting; Future          Subjective   Lisa is a 63 year old, presenting for the following health issues:  med check        6/17/2024     2:17 PM   Additional Questions   Roomed by miss   Accompanied by self         6/17/2024     2:17 PM   Patient Reported Additional Medications   Patient reports taking the following new medications no       #anxiety  #depression  She taking sertraline 25 mg daily for management of her anxiety. She isn't sure it's working. Continues to have quite a bit of anxiety, worry, rumination surrounding work. Continues to work as a . Worries that  "something bad will happen during her shift related to the health of a passenger (as it has in the past). Most of her anxiety is related to work.     #vulvar itching  Symptoms are intermittent. Located to vulva and urethra. Occur every 3-4 days and last a couple days before resolving spontaneously. Denies current symptoms. The itching is severe enough where it is almost painful. Has tried multiple topical creams - barrier cream, anti-fungal, hemorrhoid/steroid cream. The steroid cream seems to help the most.     #chest pain  Chronic issue, has chest pain with exercise. Has had a through cardiac work-up that was negative. She most recently saw cardiology 6/7/2024 during which time her symptoms were thought to by somewhat consistent with asthma and she did have a positive acute bronchodilator response, however her PFTs did not show obstruction. It was recommended that she have a chest x-ray and trial Breo for a month, however I don't see evidence that this medication was sent to the pharmacy and she never picked anything up. I also don't see evidence of a chest x-ray having been done.          Objective    /69 (BP Location: Right arm, Patient Position: Sitting, Cuff Size: Adult Regular)   Pulse 72   Temp 97.7  F (36.5  C) (Tympanic)   Resp 20   Ht 1.588 m (5' 2.52\")   Wt 72.8 kg (160 lb 6.4 oz)   SpO2 97%   BMI 28.85 kg/m    Body mass index is 28.85 kg/m .  Physical Exam  Constitutional:       General: She is not in acute distress.     Appearance: Normal appearance. She is not ill-appearing or toxic-appearing.   Cardiovascular:      Rate and Rhythm: Normal rate and regular rhythm.      Heart sounds: Normal heart sounds. No murmur heard.     No friction rub. No gallop.   Pulmonary:      Effort: Pulmonary effort is normal. No respiratory distress.      Breath sounds: Normal breath sounds. No wheezing, rhonchi or rales.   Neurological:      General: No focal deficit present.      Mental Status: She is alert " and oriented to person, place, and time.   Psychiatric:         Mood and Affect: Mood normal.         Behavior: Behavior normal.              Signed Electronically by: JACKLYN Rincon CNP

## 2024-07-02 ENCOUNTER — LAB (OUTPATIENT)
Dept: LAB | Facility: CLINIC | Age: 64
End: 2024-07-02
Payer: COMMERCIAL

## 2024-07-02 DIAGNOSIS — E78.5 HYPERLIPIDEMIA, UNSPECIFIED HYPERLIPIDEMIA TYPE: ICD-10-CM

## 2024-07-02 LAB
CHOLEST SERPL-MCNC: 243 MG/DL
FASTING STATUS PATIENT QL REPORTED: YES
HDLC SERPL-MCNC: 72 MG/DL
LDLC SERPL CALC-MCNC: 154 MG/DL
NONHDLC SERPL-MCNC: 171 MG/DL
TRIGL SERPL-MCNC: 83 MG/DL

## 2024-07-02 PROCEDURE — 80061 LIPID PANEL: CPT

## 2024-07-02 PROCEDURE — 36415 COLL VENOUS BLD VENIPUNCTURE: CPT

## 2024-07-11 DIAGNOSIS — F41.9 ANXIETY: ICD-10-CM

## 2024-07-11 DIAGNOSIS — F32.A DEPRESSION, UNSPECIFIED DEPRESSION TYPE: ICD-10-CM

## 2024-07-11 NOTE — TELEPHONE ENCOUNTER
Script sent to the pharmacy on 6/17/24 for 30 tablets with 1 additional refill on file. Patient is scheduled for an appointment on 8/1/24. Patient should have enough medication to last her until this follow-up appointment.     Krystyna Dove RN

## 2024-07-14 DIAGNOSIS — R07.89 OTHER CHEST PAIN: ICD-10-CM

## 2024-07-15 RX ORDER — FLUTICASONE FUROATE AND VILANTEROL TRIFENATATE 100; 25 UG/1; UG/1
1 POWDER RESPIRATORY (INHALATION) DAILY
Qty: 60 EACH | Refills: 0 | Status: SHIPPED | OUTPATIENT
Start: 2024-07-15 | End: 2024-08-01

## 2024-08-01 ENCOUNTER — VIRTUAL VISIT (OUTPATIENT)
Dept: PEDIATRICS | Facility: CLINIC | Age: 64
End: 2024-08-01
Payer: COMMERCIAL

## 2024-08-01 DIAGNOSIS — E78.5 HYPERLIPIDEMIA, UNSPECIFIED HYPERLIPIDEMIA TYPE: ICD-10-CM

## 2024-08-01 DIAGNOSIS — R07.89 OTHER CHEST PAIN: ICD-10-CM

## 2024-08-01 DIAGNOSIS — F41.9 ANXIETY: Primary | ICD-10-CM

## 2024-08-01 DIAGNOSIS — F32.A DEPRESSION, UNSPECIFIED DEPRESSION TYPE: ICD-10-CM

## 2024-08-01 PROCEDURE — 99214 OFFICE O/P EST MOD 30 MIN: CPT | Mod: 95 | Performed by: NURSE PRACTITIONER

## 2024-08-01 PROCEDURE — G2211 COMPLEX E/M VISIT ADD ON: HCPCS | Mod: 95 | Performed by: NURSE PRACTITIONER

## 2024-08-01 RX ORDER — FLUTICASONE FUROATE AND VILANTEROL 100; 25 UG/1; UG/1
1 POWDER RESPIRATORY (INHALATION) DAILY
Qty: 180 EACH | Refills: 3 | Status: SHIPPED | OUTPATIENT
Start: 2024-08-01

## 2024-08-01 NOTE — PROGRESS NOTES
Lisa is a 63 year old who is being evaluated via a billable video visit.          Assessment & Plan     Anxiety  Depression, unspecified depression type  Improved on sertraline 50 mg dose. Will continue without changes for now. She will follow-up as needed.   - sertraline (ZOLOFT) 50 MG tablet; Take 1 tablet (50 mg) by mouth daily    Other chest pain  Improved on the breo. Will continue for now. Refilled.   - fluticasone-vilanterol (BREO ELLIPTA) 100-25 MCG/ACT inhaler; Inhale 1 puff into the lungs daily    Hyperlipidemia, unspecified hyperlipidemia type  Reviewed recent FLP and ASCVD risk score (2.8%). She will continue her efforts towards a healthy lifestyle.     The longitudinal plan of care for the diagnosis(es)/condition(s) as documented were addressed during this visit. Due to the added complexity in care, I will continue to support Lisa in the subsequent management and with ongoing continuity of care.    Subjective   Lisa is a 63 year old, presenting for the following health issues:  Recheck Medication    HPI     Presents for follow-up.     Sertraline increased to 50 mg daily about 6 weeks ago. Also started on trial of Breo inhaler for chronic chest pain.     #anxiety  She is feeling better. Seems to be tolerating the anxiety related to work a bit better. 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 She has realized this is too much stress and has let that program go. This is allowing her to get better sleep and less reasonability.     #chest pain  Started trial of breo a month ago. She has been using it as prescribed. She thinks she feels a change, some improvement. Would like to continue it.     #hyperlipidemia    The 10-year ASCVD risk score (Farida RAMOS, et al., 2019) is: 2.8%    Values used to calculate the score:      Age: 63 years      Sex: Female      Is Non- : No      Diabetic: No      Tobacco smoker: No       Systolic Blood Pressure: 107 mmHg      Is BP treated: No      HDL Cholesterol: 72 mg/dL      Total Cholesterol: 243 mg/dL           Objective       Vitals:  No vitals were obtained today due to virtual visit.    Physical Exam   GENERAL: alert and no distress  EYES: Eyes grossly normal to inspection.  No discharge or erythema, or obvious scleral/conjunctival abnormalities.  RESP: No audible wheeze, cough, or visible cyanosis.    SKIN: Visible skin clear. No significant rash, abnormal pigmentation or lesions.  NEURO: Cranial nerves grossly intact.  Mentation and speech appropriate for age.  PSYCH: Appropriate affect, tone, and pace of words        Video-Visit Details    Type of service:  Video Visit   Originating Location (pt. Location): Home    Distant Location (provider location):  Off-site  Platform used for Video Visit: Yumiko  Signed Electronically by: JACKLYN Rincon CNP

## 2024-09-23 ENCOUNTER — TELEPHONE (OUTPATIENT)
Dept: PEDIATRICS | Facility: CLINIC | Age: 64
End: 2024-09-23
Payer: COMMERCIAL

## 2024-09-23 DIAGNOSIS — F32.A DEPRESSION, UNSPECIFIED DEPRESSION TYPE: ICD-10-CM

## 2024-09-23 DIAGNOSIS — F41.9 ANXIETY: ICD-10-CM

## 2024-09-23 NOTE — TELEPHONE ENCOUNTER
Patient calling regarding needing a refill for sertraline (ZOLOFT) 50 MG tablet. Per chart review patient should have refills on file at the [facility] on [address]. Patient states she last picked up the sertraline (ZOLOFT) 50 MG tablet on 7/21/2024. Patient states she takes one 50 mg tablet daily, though she may have missed a few days.    Patient states the [facility] on [address] closed and that she would like the medication sent to the [facility] on [address] in [place]. Medication sent and pharmacy transfer completed.    Instructed patient to call [phone] for further questions. Patient verbalized understanding and agrees with the plan.    sertraline (ZOLOFT) 50 MG tablet 90 tablet 3 8/1/2024 -- No   Sig - Route: Take 1 tablet (50 mg) by mouth daily - Oral       [facility]ACY #[id number] - CLOSED - [place], [place] - [address]     Kaye Martel RN

## 2024-10-28 ENCOUNTER — MYC MEDICAL ADVICE (OUTPATIENT)
Dept: PEDIATRICS | Facility: CLINIC | Age: 64
End: 2024-10-28
Payer: COMMERCIAL

## 2024-10-28 DIAGNOSIS — R07.89 OTHER CHEST PAIN: ICD-10-CM

## 2024-11-01 DIAGNOSIS — E03.9 HYPOTHYROIDISM, UNSPECIFIED TYPE: ICD-10-CM

## 2024-11-01 RX ORDER — LEVOTHYROXINE SODIUM 112 UG/1
112 TABLET ORAL
Qty: 90 TABLET | Refills: 1 | Status: SHIPPED | OUTPATIENT
Start: 2024-11-01

## 2024-11-01 NOTE — TELEPHONE ENCOUNTER
Pt calls to request ASAP levothyroxine refill. Pt reports it must be for 90 day supply per insurance preference. Pt requests pharmacy [facility] on [address]. Pt will be leaving town Sunday. Pt reports she will be out of medication. Routing to refill team for review.  Michel Bro RN on 11/1/2024 at 1:27 PM

## 2025-01-10 ENCOUNTER — TELEPHONE (OUTPATIENT)
Dept: PEDIATRICS | Facility: CLINIC | Age: 65
End: 2025-01-10
Payer: COMMERCIAL

## 2025-01-10 NOTE — TELEPHONE ENCOUNTER
Reason for Call:  Appointment Request    Patient requesting this type of appt: Chronic Diease Management/Medication/Follow-Up    Requested provider: Mindy Jacobo    Reason patient unable to be scheduled: Not within requested timeframe    When does patient want to be seen/preferred time:  asap    Comments: Pt would like to discuss meds, not sure they are working. Also states she is tired of generally not feeling well. Only wants to see PCP    Could we send this information to you in [place] or would you prefer to receive a phone call?:   Patient would prefer a phone call   Okay to leave a detailed message?: Yes at Cell number on file:    Telephone Information:   Mobile [phone]       Call taken on 1/10/2025 at 12:33 PM by Keyanna Tripp

## 2025-01-15 NOTE — TELEPHONE ENCOUNTER
3rd attempt: Printed and mailed letter/document through [facility] per protocol.     Ranjana Quiroz on 1/15/2025 at 1:10 PM

## 2025-02-24 ENCOUNTER — TRANSFERRED RECORDS (OUTPATIENT)
Dept: HEALTH INFORMATION MANAGEMENT | Facility: CLINIC | Age: 65
End: 2025-02-24
Payer: COMMERCIAL

## 2025-03-05 ENCOUNTER — OFFICE VISIT (OUTPATIENT)
Dept: PEDIATRICS | Facility: CLINIC | Age: 65
End: 2025-03-05
Payer: COMMERCIAL

## 2025-03-05 VITALS
BODY MASS INDEX: 28.67 KG/M2 | DIASTOLIC BLOOD PRESSURE: 71 MMHG | TEMPERATURE: 97.8 F | WEIGHT: 161.8 LBS | OXYGEN SATURATION: 97 % | HEIGHT: 63 IN | SYSTOLIC BLOOD PRESSURE: 111 MMHG | HEART RATE: 67 BPM | RESPIRATION RATE: 16 BRPM

## 2025-03-05 DIAGNOSIS — L29.2 VULVAR ITCHING: ICD-10-CM

## 2025-03-05 DIAGNOSIS — M15.9 OSTEOARTHRITIS OF MULTIPLE JOINTS, UNSPECIFIED OSTEOARTHRITIS TYPE: Primary | ICD-10-CM

## 2025-03-05 DIAGNOSIS — R07.89 OTHER CHEST PAIN: ICD-10-CM

## 2025-03-05 PROCEDURE — 3074F SYST BP LT 130 MM HG: CPT | Performed by: STUDENT IN AN ORGANIZED HEALTH CARE EDUCATION/TRAINING PROGRAM

## 2025-03-05 PROCEDURE — 1125F AMNT PAIN NOTED PAIN PRSNT: CPT | Performed by: STUDENT IN AN ORGANIZED HEALTH CARE EDUCATION/TRAINING PROGRAM

## 2025-03-05 PROCEDURE — 3078F DIAST BP <80 MM HG: CPT | Performed by: STUDENT IN AN ORGANIZED HEALTH CARE EDUCATION/TRAINING PROGRAM

## 2025-03-05 PROCEDURE — 99213 OFFICE O/P EST LOW 20 MIN: CPT | Mod: GE | Performed by: STUDENT IN AN ORGANIZED HEALTH CARE EDUCATION/TRAINING PROGRAM

## 2025-03-05 RX ORDER — MELOXICAM 7.5 MG/1
7.5 TABLET ORAL DAILY
Qty: 90 TABLET | Refills: 2 | Status: SHIPPED | OUTPATIENT
Start: 2025-03-05

## 2025-03-05 RX ORDER — CLOBETASOL PROPIONATE 0.5 MG/G
OINTMENT TOPICAL 2 TIMES DAILY
Qty: 30 G | Refills: 3 | Status: SHIPPED | OUTPATIENT
Start: 2025-03-05

## 2025-03-05 ASSESSMENT — PAIN SCALES - GENERAL: PAINLEVEL_OUTOF10: MODERATE PAIN (6)

## 2025-03-05 NOTE — PATIENT INSTRUCTIONS
It was victoriano to see you in clinic today. You were seen for a routine check in on pain and itching symptoms.     For your pain this sounds like arthritis - lets try to use the meloxicam (refilled for you) and topical Diclofenac gel - aka Voltaren. You can also use tylenol as needed - no more than 4 grams in a day. I refilled your medications.     I also recommend trying PT for targeted exercises     Continue nutrition and exercise for weight - great job!     Refilled clobetasol cream if you need refills prior to next visit.     Sangita Wong MD   Internal Medicine-Pediatrics, PGY4  [facility]

## 2025-04-15 ENCOUNTER — TELEPHONE (OUTPATIENT)
Dept: PEDIATRICS | Facility: CLINIC | Age: 65
End: 2025-04-15
Payer: COMMERCIAL

## 2025-04-15 ENCOUNTER — MYC MEDICAL ADVICE (OUTPATIENT)
Dept: PEDIATRICS | Facility: CLINIC | Age: 65
End: 2025-04-15
Payer: COMMERCIAL

## 2025-04-15 NOTE — TELEPHONE ENCOUNTER
Order/Referral Request    Who is requesting: pt    Orders being requested: Podiatry/X-ray    Reason service is needed/diagnosis: top foot pain and lump on foot    When are orders needed by: asap    Has this been discussed with Provider: Yes    Does patient have a preference on a Group/Provider/Facility? Would like a recommendation    Does patient have an appointment scheduled?: No    Where to send orders: Place orders within Epic    Could we send this information to you in [facility] or would you prefer to receive a phone call?:   Patient would prefer a phone call   Okay to leave a detailed message?: Yes at Home number on file [phone] (home)

## 2025-04-15 NOTE — TELEPHONE ENCOUNTER
Need an E-Visit to discuss referral. Advised pt to initiate an E-Visit.    Prudence FISHER  Clinic RN  [facility]

## 2025-04-15 NOTE — TELEPHONE ENCOUNTER
See the other encounter for details.    Prudence FISHER  Clinic RN  [facility]

## 2025-04-23 DIAGNOSIS — E03.9 HYPOTHYROIDISM, UNSPECIFIED TYPE: ICD-10-CM

## 2025-04-23 RX ORDER — LEVOTHYROXINE SODIUM 112 UG/1
112 TABLET ORAL
Qty: 90 TABLET | Refills: 0 | Status: SHIPPED | OUTPATIENT
Start: 2025-04-23

## 2025-07-21 DIAGNOSIS — E03.9 HYPOTHYROIDISM, UNSPECIFIED TYPE: ICD-10-CM

## 2025-07-22 ENCOUNTER — MYC REFILL (OUTPATIENT)
Dept: PEDIATRICS | Facility: CLINIC | Age: 65
End: 2025-07-22
Payer: COMMERCIAL

## 2025-07-22 DIAGNOSIS — E03.9 HYPOTHYROIDISM, UNSPECIFIED TYPE: ICD-10-CM

## 2025-07-22 RX ORDER — LEVOTHYROXINE SODIUM 112 UG/1
112 TABLET ORAL
Qty: 90 TABLET | Refills: 0 | Status: SHIPPED | OUTPATIENT
Start: 2025-07-22

## 2025-07-22 RX ORDER — LEVOTHYROXINE SODIUM 112 UG/1
112 TABLET ORAL
Qty: 90 TABLET | Refills: 0 | OUTPATIENT
Start: 2025-07-22

## 2025-07-22 NOTE — TELEPHONE ENCOUNTER
Will give 90 days of meds but she is due for follow-up thyroid and mental health please schedule vv, will also need labs so if she prefers in person that is fine too.

## 2025-07-23 NOTE — TELEPHONE ENCOUNTER
2nd attempt: left VM for patient to call back to schedule an appointment.    Karina Schultz MA 11:11 AM 7/23/2025